# Patient Record
Sex: MALE | Race: WHITE | NOT HISPANIC OR LATINO | Employment: FULL TIME | URBAN - METROPOLITAN AREA
[De-identification: names, ages, dates, MRNs, and addresses within clinical notes are randomized per-mention and may not be internally consistent; named-entity substitution may affect disease eponyms.]

---

## 2023-11-23 ENCOUNTER — HOSPITAL ENCOUNTER (INPATIENT)
Facility: HOSPITAL | Age: 43
LOS: 4 days | Discharge: HOME | DRG: 641 | End: 2023-11-27
Attending: STUDENT IN AN ORGANIZED HEALTH CARE EDUCATION/TRAINING PROGRAM | Admitting: INTERNAL MEDICINE
Payer: MEDICAID

## 2023-11-23 DIAGNOSIS — R11.2 NAUSEA AND VOMITING, UNSPECIFIED VOMITING TYPE: ICD-10-CM

## 2023-11-23 DIAGNOSIS — D62 ACUTE BLOOD LOSS ANEMIA: ICD-10-CM

## 2023-11-23 DIAGNOSIS — R07.9 CHEST PAIN: ICD-10-CM

## 2023-11-23 DIAGNOSIS — E87.6 HYPOKALEMIA: ICD-10-CM

## 2023-11-23 DIAGNOSIS — F10.20 ALCOHOLISM: Primary | ICD-10-CM

## 2023-11-23 LAB
ALBUMIN SERPL BCP-MCNC: 2.3 G/DL (ref 3.5–5.2)
ALP SERPL-CCNC: 204 U/L (ref 55–135)
ALT SERPL W/O P-5'-P-CCNC: 52 U/L (ref 10–44)
ANION GAP SERPL CALC-SCNC: 18 MMOL/L (ref 8–16)
AST SERPL-CCNC: 158 U/L (ref 10–40)
BASOPHILS # BLD AUTO: 0.03 K/UL (ref 0–0.2)
BASOPHILS NFR BLD: 0.5 % (ref 0–1.9)
BILIRUB SERPL-MCNC: 2.8 MG/DL (ref 0.1–1)
BILIRUB UR QL STRIP: ABNORMAL
BUN SERPL-MCNC: 5 MG/DL (ref 6–20)
CALCIUM SERPL-MCNC: 7.4 MG/DL (ref 8.7–10.5)
CHLORIDE SERPL-SCNC: 98 MMOL/L (ref 95–110)
CLARITY UR: CLEAR
CO2 SERPL-SCNC: 18 MMOL/L (ref 23–29)
COLOR UR: YELLOW
CREAT SERPL-MCNC: 0.5 MG/DL (ref 0.5–1.4)
DIFFERENTIAL METHOD: ABNORMAL
EOSINOPHIL # BLD AUTO: 0 K/UL (ref 0–0.5)
EOSINOPHIL NFR BLD: 0 % (ref 0–8)
ERYTHROCYTE [DISTWIDTH] IN BLOOD BY AUTOMATED COUNT: 14.8 % (ref 11.5–14.5)
EST. GFR  (NO RACE VARIABLE): >60 ML/MIN/1.73 M^2
ETHANOL SERPL-MCNC: <10 MG/DL
GLUCOSE SERPL-MCNC: 166 MG/DL (ref 70–110)
GLUCOSE UR QL STRIP: NEGATIVE
HCT VFR BLD AUTO: 26.5 % (ref 40–54)
HGB BLD-MCNC: 9.2 G/DL (ref 14–18)
HGB UR QL STRIP: NEGATIVE
IMM GRANULOCYTES # BLD AUTO: 0.01 K/UL (ref 0–0.04)
IMM GRANULOCYTES NFR BLD AUTO: 0.2 % (ref 0–0.5)
KETONES UR QL STRIP: NEGATIVE
LEUKOCYTE ESTERASE UR QL STRIP: NEGATIVE
LIPASE SERPL-CCNC: 24 U/L (ref 4–60)
LYMPHOCYTES # BLD AUTO: 0.9 K/UL (ref 1–4.8)
LYMPHOCYTES NFR BLD: 15.8 % (ref 18–48)
MAGNESIUM SERPL-MCNC: 1.2 MG/DL (ref 1.6–2.6)
MCH RBC QN AUTO: 39 PG (ref 27–31)
MCHC RBC AUTO-ENTMCNC: 34.7 G/DL (ref 32–36)
MCV RBC AUTO: 112 FL (ref 82–98)
MONOCYTES # BLD AUTO: 0.6 K/UL (ref 0.3–1)
MONOCYTES NFR BLD: 10.3 % (ref 4–15)
NEUTROPHILS # BLD AUTO: 4.1 K/UL (ref 1.8–7.7)
NEUTROPHILS NFR BLD: 73.2 % (ref 38–73)
NITRITE UR QL STRIP: NEGATIVE
NRBC BLD-RTO: 0 /100 WBC
PH UR STRIP: 7 [PH] (ref 5–8)
PLATELET # BLD AUTO: 93 K/UL (ref 150–450)
PMV BLD AUTO: 12.2 FL (ref 9.2–12.9)
POTASSIUM SERPL-SCNC: 2.8 MMOL/L (ref 3.5–5.1)
PROT SERPL-MCNC: 5.8 G/DL (ref 6–8.4)
PROT UR QL STRIP: ABNORMAL
RBC # BLD AUTO: 2.36 M/UL (ref 4.6–6.2)
SODIUM SERPL-SCNC: 134 MMOL/L (ref 136–145)
SP GR UR STRIP: 1.01 (ref 1–1.03)
URN SPEC COLLECT METH UR: ABNORMAL
UROBILINOGEN UR STRIP-ACNC: NEGATIVE EU/DL
WBC # BLD AUTO: 5.64 K/UL (ref 3.9–12.7)

## 2023-11-23 PROCEDURE — 21400001 HC TELEMETRY ROOM

## 2023-11-23 PROCEDURE — 96361 HYDRATE IV INFUSION ADD-ON: CPT

## 2023-11-23 PROCEDURE — 96365 THER/PROPH/DIAG IV INF INIT: CPT

## 2023-11-23 PROCEDURE — 82746 ASSAY OF FOLIC ACID SERUM: CPT | Performed by: INTERNAL MEDICINE

## 2023-11-23 PROCEDURE — 25000003 PHARM REV CODE 250: Performed by: STUDENT IN AN ORGANIZED HEALTH CARE EDUCATION/TRAINING PROGRAM

## 2023-11-23 PROCEDURE — 82607 VITAMIN B-12: CPT | Performed by: INTERNAL MEDICINE

## 2023-11-23 PROCEDURE — 85025 COMPLETE CBC W/AUTO DIFF WBC: CPT | Performed by: STUDENT IN AN ORGANIZED HEALTH CARE EDUCATION/TRAINING PROGRAM

## 2023-11-23 PROCEDURE — 93005 ELECTROCARDIOGRAM TRACING: CPT | Performed by: GENERAL PRACTICE

## 2023-11-23 PROCEDURE — 99285 EMERGENCY DEPT VISIT HI MDM: CPT | Mod: 25

## 2023-11-23 PROCEDURE — 84466 ASSAY OF TRANSFERRIN: CPT | Performed by: INTERNAL MEDICINE

## 2023-11-23 PROCEDURE — 83735 ASSAY OF MAGNESIUM: CPT | Performed by: INTERNAL MEDICINE

## 2023-11-23 PROCEDURE — 82077 ASSAY SPEC XCP UR&BREATH IA: CPT | Performed by: STUDENT IN AN ORGANIZED HEALTH CARE EDUCATION/TRAINING PROGRAM

## 2023-11-23 PROCEDURE — 83540 ASSAY OF IRON: CPT | Performed by: INTERNAL MEDICINE

## 2023-11-23 PROCEDURE — 93010 EKG 12-LEAD: ICD-10-PCS | Mod: ,,, | Performed by: GENERAL PRACTICE

## 2023-11-23 PROCEDURE — 96375 TX/PRO/DX INJ NEW DRUG ADDON: CPT

## 2023-11-23 PROCEDURE — 36415 COLL VENOUS BLD VENIPUNCTURE: CPT | Performed by: INTERNAL MEDICINE

## 2023-11-23 PROCEDURE — 80053 COMPREHEN METABOLIC PANEL: CPT | Performed by: STUDENT IN AN ORGANIZED HEALTH CARE EDUCATION/TRAINING PROGRAM

## 2023-11-23 PROCEDURE — 93010 ELECTROCARDIOGRAM REPORT: CPT | Mod: ,,, | Performed by: GENERAL PRACTICE

## 2023-11-23 PROCEDURE — 81003 URINALYSIS AUTO W/O SCOPE: CPT | Performed by: STUDENT IN AN ORGANIZED HEALTH CARE EDUCATION/TRAINING PROGRAM

## 2023-11-23 PROCEDURE — 83690 ASSAY OF LIPASE: CPT | Performed by: STUDENT IN AN ORGANIZED HEALTH CARE EDUCATION/TRAINING PROGRAM

## 2023-11-23 PROCEDURE — 82728 ASSAY OF FERRITIN: CPT | Performed by: INTERNAL MEDICINE

## 2023-11-23 PROCEDURE — 63600175 PHARM REV CODE 636 W HCPCS: Performed by: STUDENT IN AN ORGANIZED HEALTH CARE EDUCATION/TRAINING PROGRAM

## 2023-11-23 RX ORDER — ONDANSETRON 2 MG/ML
4 INJECTION INTRAMUSCULAR; INTRAVENOUS
Status: COMPLETED | OUTPATIENT
Start: 2023-11-23 | End: 2023-11-23

## 2023-11-23 RX ORDER — SODIUM,POTASSIUM PHOSPHATES 280-250MG
2 POWDER IN PACKET (EA) ORAL
Status: DISCONTINUED | OUTPATIENT
Start: 2023-11-24 | End: 2023-11-27 | Stop reason: HOSPADM

## 2023-11-23 RX ORDER — LORAZEPAM 2 MG/ML
2 INJECTION INTRAMUSCULAR EVERY 4 HOURS PRN
Status: DISCONTINUED | OUTPATIENT
Start: 2023-11-24 | End: 2023-11-27 | Stop reason: HOSPADM

## 2023-11-23 RX ORDER — ACETAMINOPHEN 500 MG
1000 TABLET ORAL
Status: DISCONTINUED | OUTPATIENT
Start: 2023-11-23 | End: 2023-11-23

## 2023-11-23 RX ORDER — IBUPROFEN 200 MG
16 TABLET ORAL
Status: DISCONTINUED | OUTPATIENT
Start: 2023-11-24 | End: 2023-11-27 | Stop reason: HOSPADM

## 2023-11-23 RX ORDER — PANTOPRAZOLE SODIUM 40 MG/10ML
40 INJECTION, POWDER, LYOPHILIZED, FOR SOLUTION INTRAVENOUS DAILY
Status: DISCONTINUED | OUTPATIENT
Start: 2023-11-24 | End: 2023-11-24

## 2023-11-23 RX ORDER — POTASSIUM CHLORIDE 7.45 MG/ML
60 INJECTION INTRAVENOUS ONCE
Status: COMPLETED | OUTPATIENT
Start: 2023-11-24 | End: 2023-11-24

## 2023-11-23 RX ORDER — LORAZEPAM 2 MG/ML
0.5 INJECTION INTRAMUSCULAR EVERY 4 HOURS PRN
Status: DISCONTINUED | OUTPATIENT
Start: 2023-11-24 | End: 2023-11-27 | Stop reason: HOSPADM

## 2023-11-23 RX ORDER — POTASSIUM CHLORIDE 7.45 MG/ML
10 INJECTION INTRAVENOUS ONCE
Status: COMPLETED | OUTPATIENT
Start: 2023-11-23 | End: 2023-11-24

## 2023-11-23 RX ORDER — GLUCAGON 1 MG
1 KIT INJECTION
Status: DISCONTINUED | OUTPATIENT
Start: 2023-11-24 | End: 2023-11-27 | Stop reason: HOSPADM

## 2023-11-23 RX ORDER — LORAZEPAM 2 MG/ML
1 INJECTION INTRAMUSCULAR EVERY 4 HOURS PRN
Status: DISCONTINUED | OUTPATIENT
Start: 2023-11-24 | End: 2023-11-27 | Stop reason: HOSPADM

## 2023-11-23 RX ORDER — POTASSIUM CHLORIDE 7.45 MG/ML
60 INJECTION INTRAVENOUS ONCE
Status: DISCONTINUED | OUTPATIENT
Start: 2023-11-23 | End: 2023-11-23

## 2023-11-23 RX ORDER — CHLORDIAZEPOXIDE HYDROCHLORIDE 5 MG/1
5 CAPSULE, GELATIN COATED ORAL 3 TIMES DAILY
Status: DISCONTINUED | OUTPATIENT
Start: 2023-11-23 | End: 2023-11-25

## 2023-11-23 RX ORDER — SPIRONOLACTONE 25 MG/1
25 TABLET ORAL ONCE
Status: COMPLETED | OUTPATIENT
Start: 2023-11-23 | End: 2023-11-24

## 2023-11-23 RX ORDER — LANOLIN ALCOHOL/MO/W.PET/CERES
400 CREAM (GRAM) TOPICAL ONCE
Status: COMPLETED | OUTPATIENT
Start: 2023-11-23 | End: 2023-11-23

## 2023-11-23 RX ORDER — MAGNESIUM SULFATE HEPTAHYDRATE 40 MG/ML
2 INJECTION, SOLUTION INTRAVENOUS
Status: DISCONTINUED | OUTPATIENT
Start: 2023-11-24 | End: 2023-11-27 | Stop reason: HOSPADM

## 2023-11-23 RX ORDER — THIAMINE HCL 100 MG
100 TABLET ORAL DAILY
Status: DISCONTINUED | OUTPATIENT
Start: 2023-11-24 | End: 2023-11-27 | Stop reason: HOSPADM

## 2023-11-23 RX ORDER — MAGNESIUM SULFATE HEPTAHYDRATE 40 MG/ML
4 INJECTION, SOLUTION INTRAVENOUS
Status: DISCONTINUED | OUTPATIENT
Start: 2023-11-24 | End: 2023-11-27 | Stop reason: HOSPADM

## 2023-11-23 RX ORDER — CHLORDIAZEPOXIDE HYDROCHLORIDE 10 MG/1
10 CAPSULE, GELATIN COATED ORAL 3 TIMES DAILY
Status: DISCONTINUED | OUTPATIENT
Start: 2023-11-23 | End: 2023-11-23

## 2023-11-23 RX ORDER — SODIUM CHLORIDE 9 MG/ML
INJECTION, SOLUTION INTRAVENOUS CONTINUOUS
Status: ACTIVE | OUTPATIENT
Start: 2023-11-24 | End: 2023-11-25

## 2023-11-23 RX ORDER — IBUPROFEN 200 MG
24 TABLET ORAL
Status: DISCONTINUED | OUTPATIENT
Start: 2023-11-24 | End: 2023-11-27 | Stop reason: HOSPADM

## 2023-11-23 RX ORDER — MAGNESIUM SULFATE HEPTAHYDRATE 40 MG/ML
4 INJECTION, SOLUTION INTRAVENOUS ONCE
Status: COMPLETED | OUTPATIENT
Start: 2023-11-23 | End: 2023-11-24

## 2023-11-23 RX ORDER — NALOXONE HCL 0.4 MG/ML
0.02 VIAL (ML) INJECTION
Status: DISCONTINUED | OUTPATIENT
Start: 2023-11-24 | End: 2023-11-27 | Stop reason: HOSPADM

## 2023-11-23 RX ORDER — CHLORDIAZEPOXIDE HYDROCHLORIDE 25 MG/1
25 CAPSULE, GELATIN COATED ORAL 3 TIMES DAILY
Status: DISCONTINUED | OUTPATIENT
Start: 2023-11-23 | End: 2023-11-23

## 2023-11-23 RX ORDER — SODIUM CHLORIDE 0.9 % (FLUSH) 0.9 %
10 SYRINGE (ML) INJECTION EVERY 12 HOURS PRN
Status: DISCONTINUED | OUTPATIENT
Start: 2023-11-24 | End: 2023-11-27 | Stop reason: HOSPADM

## 2023-11-23 RX ADMIN — SODIUM CHLORIDE 1000 ML: 0.9 INJECTION, SOLUTION INTRAVENOUS at 08:11

## 2023-11-23 RX ADMIN — POTASSIUM BICARBONATE 40 MEQ: 782 TABLET, EFFERVESCENT ORAL at 09:11

## 2023-11-23 RX ADMIN — THIAMINE HYDROCHLORIDE 100 MG: 100 INJECTION, SOLUTION INTRAMUSCULAR; INTRAVENOUS at 10:11

## 2023-11-23 RX ADMIN — Medication 400 MG: at 09:11

## 2023-11-23 RX ADMIN — SODIUM CHLORIDE, SODIUM LACTATE, POTASSIUM CHLORIDE, AND CALCIUM CHLORIDE 1000 ML: .6; .31; .03; .02 INJECTION, SOLUTION INTRAVENOUS at 11:11

## 2023-11-23 RX ADMIN — POTASSIUM CHLORIDE 10 MEQ: 7.46 INJECTION, SOLUTION INTRAVENOUS at 11:11

## 2023-11-23 RX ADMIN — ONDANSETRON 4 MG: 2 INJECTION INTRAMUSCULAR; INTRAVENOUS at 08:11

## 2023-11-24 LAB
ALBUMIN SERPL BCP-MCNC: 2 G/DL (ref 3.5–5.2)
ALP SERPL-CCNC: 189 U/L (ref 55–135)
ALT SERPL W/O P-5'-P-CCNC: 45 U/L (ref 10–44)
AMMONIA PLAS-SCNC: 80 UMOL/L (ref 10–50)
ANION GAP SERPL CALC-SCNC: 9 MMOL/L (ref 8–16)
AST SERPL-CCNC: 147 U/L (ref 10–40)
BASOPHILS # BLD AUTO: 0.03 K/UL (ref 0–0.2)
BASOPHILS NFR BLD: 0.5 % (ref 0–1.9)
BILIRUB SERPL-MCNC: 2.9 MG/DL (ref 0.1–1)
BUN SERPL-MCNC: 3 MG/DL (ref 6–20)
CALCIUM SERPL-MCNC: 6.9 MG/DL (ref 8.7–10.5)
CHLORIDE SERPL-SCNC: 105 MMOL/L (ref 95–110)
CO2 SERPL-SCNC: 21 MMOL/L (ref 23–29)
CREAT SERPL-MCNC: 0.4 MG/DL (ref 0.5–1.4)
DIFFERENTIAL METHOD: ABNORMAL
EOSINOPHIL # BLD AUTO: 0.1 K/UL (ref 0–0.5)
EOSINOPHIL NFR BLD: 0.9 % (ref 0–8)
ERYTHROCYTE [DISTWIDTH] IN BLOOD BY AUTOMATED COUNT: 14.8 % (ref 11.5–14.5)
EST. GFR  (NO RACE VARIABLE): >60 ML/MIN/1.73 M^2
FERRITIN SERPL-MCNC: 1206 NG/ML (ref 20–300)
FOLATE SERPL-MCNC: 5.4 NG/ML (ref 4–24)
GLUCOSE SERPL-MCNC: 90 MG/DL (ref 70–110)
HCT VFR BLD AUTO: 23.6 % (ref 40–54)
HGB BLD-MCNC: 8.2 G/DL (ref 14–18)
IMM GRANULOCYTES # BLD AUTO: 0.03 K/UL (ref 0–0.04)
IMM GRANULOCYTES NFR BLD AUTO: 0.5 % (ref 0–0.5)
INR PPP: 1.3 (ref 0.8–1.2)
IRON SERPL-MCNC: 102 UG/DL (ref 45–160)
LACTATE SERPL-SCNC: 1.8 MMOL/L (ref 0.5–1.9)
LACTATE SERPL-SCNC: 5.3 MMOL/L (ref 0.5–1.9)
LYMPHOCYTES # BLD AUTO: 2 K/UL (ref 1–4.8)
LYMPHOCYTES NFR BLD: 35.9 % (ref 18–48)
MCH RBC QN AUTO: 38.5 PG (ref 27–31)
MCHC RBC AUTO-ENTMCNC: 34.7 G/DL (ref 32–36)
MCV RBC AUTO: 111 FL (ref 82–98)
MONOCYTES # BLD AUTO: 0.5 K/UL (ref 0.3–1)
MONOCYTES NFR BLD: 9.3 % (ref 4–15)
NEUTROPHILS # BLD AUTO: 2.9 K/UL (ref 1.8–7.7)
NEUTROPHILS NFR BLD: 52.9 % (ref 38–73)
NRBC BLD-RTO: 0 /100 WBC
PLATELET # BLD AUTO: 77 K/UL (ref 150–450)
PMV BLD AUTO: 12.1 FL (ref 9.2–12.9)
POTASSIUM SERPL-SCNC: 3.3 MMOL/L (ref 3.5–5.1)
PROT SERPL-MCNC: 5 G/DL (ref 6–8.4)
PROTHROMBIN TIME: 14.6 SEC (ref 9–12.5)
RBC # BLD AUTO: 2.13 M/UL (ref 4.6–6.2)
SATURATED IRON: ABNORMAL % (ref 20–50)
SODIUM SERPL-SCNC: 135 MMOL/L (ref 136–145)
TOTAL IRON BINDING CAPACITY: ABNORMAL UG/DL (ref 250–450)
TRANSFERRIN SERPL-MCNC: <75 MG/DL (ref 200–375)
VIT B12 SERPL-MCNC: 873 PG/ML (ref 210–950)
WBC # BLD AUTO: 5.46 K/UL (ref 3.9–12.7)

## 2023-11-24 PROCEDURE — C9113 INJ PANTOPRAZOLE SODIUM, VIA: HCPCS | Performed by: STUDENT IN AN ORGANIZED HEALTH CARE EDUCATION/TRAINING PROGRAM

## 2023-11-24 PROCEDURE — 25000003 PHARM REV CODE 250: Performed by: INTERNAL MEDICINE

## 2023-11-24 PROCEDURE — 82140 ASSAY OF AMMONIA: CPT | Performed by: INTERNAL MEDICINE

## 2023-11-24 PROCEDURE — 63600175 PHARM REV CODE 636 W HCPCS: Performed by: INTERNAL MEDICINE

## 2023-11-24 PROCEDURE — 63600175 PHARM REV CODE 636 W HCPCS: Performed by: STUDENT IN AN ORGANIZED HEALTH CARE EDUCATION/TRAINING PROGRAM

## 2023-11-24 PROCEDURE — 25000003 PHARM REV CODE 250: Performed by: STUDENT IN AN ORGANIZED HEALTH CARE EDUCATION/TRAINING PROGRAM

## 2023-11-24 PROCEDURE — S4991 NICOTINE PATCH NONLEGEND: HCPCS | Performed by: STUDENT IN AN ORGANIZED HEALTH CARE EDUCATION/TRAINING PROGRAM

## 2023-11-24 PROCEDURE — 85025 COMPLETE CBC W/AUTO DIFF WBC: CPT | Performed by: INTERNAL MEDICINE

## 2023-11-24 PROCEDURE — 83605 ASSAY OF LACTIC ACID: CPT | Performed by: STUDENT IN AN ORGANIZED HEALTH CARE EDUCATION/TRAINING PROGRAM

## 2023-11-24 PROCEDURE — 36415 COLL VENOUS BLD VENIPUNCTURE: CPT | Performed by: INTERNAL MEDICINE

## 2023-11-24 PROCEDURE — 83605 ASSAY OF LACTIC ACID: CPT | Mod: 91 | Performed by: INTERNAL MEDICINE

## 2023-11-24 PROCEDURE — 92610 EVALUATE SWALLOWING FUNCTION: CPT

## 2023-11-24 PROCEDURE — 21400001 HC TELEMETRY ROOM

## 2023-11-24 PROCEDURE — 80053 COMPREHEN METABOLIC PANEL: CPT | Performed by: INTERNAL MEDICINE

## 2023-11-24 PROCEDURE — 85610 PROTHROMBIN TIME: CPT | Performed by: INTERNAL MEDICINE

## 2023-11-24 RX ORDER — PANTOPRAZOLE SODIUM 40 MG/10ML
40 INJECTION, POWDER, LYOPHILIZED, FOR SOLUTION INTRAVENOUS 2 TIMES DAILY
Status: DISCONTINUED | OUTPATIENT
Start: 2023-11-24 | End: 2023-11-25

## 2023-11-24 RX ORDER — OXYCODONE HYDROCHLORIDE 5 MG/1
5 TABLET ORAL EVERY 6 HOURS PRN
Status: DISCONTINUED | OUTPATIENT
Start: 2023-11-24 | End: 2023-11-27 | Stop reason: HOSPADM

## 2023-11-24 RX ORDER — CITALOPRAM 10 MG/1
10 TABLET ORAL DAILY
Status: DISCONTINUED | OUTPATIENT
Start: 2023-11-24 | End: 2023-11-26

## 2023-11-24 RX ORDER — IBUPROFEN 200 MG
1 TABLET ORAL DAILY
Status: DISCONTINUED | OUTPATIENT
Start: 2023-11-24 | End: 2023-11-27 | Stop reason: HOSPADM

## 2023-11-24 RX ADMIN — THIAMINE HCL TAB 100 MG 100 MG: 100 TAB at 08:11

## 2023-11-24 RX ADMIN — NICOTINE 1 PATCH: 14 PATCH, EXTENDED RELEASE TRANSDERMAL at 08:11

## 2023-11-24 RX ADMIN — MAGNESIUM SULFATE HEPTAHYDRATE 4 G: 40 INJECTION, SOLUTION INTRAVENOUS at 01:11

## 2023-11-24 RX ADMIN — CHLORDIAZEPOXIDE HYDROCHLORIDE 5 MG: 5 CAPSULE ORAL at 08:11

## 2023-11-24 RX ADMIN — CITALOPRAM HYDROBROMIDE 10 MG: 10 TABLET ORAL at 03:11

## 2023-11-24 RX ADMIN — OXYCODONE HYDROCHLORIDE 5 MG: 5 TABLET ORAL at 03:11

## 2023-11-24 RX ADMIN — SPIRONOLACTONE 25 MG: 25 TABLET ORAL at 01:11

## 2023-11-24 RX ADMIN — PANTOPRAZOLE SODIUM 40 MG: 40 INJECTION, POWDER, LYOPHILIZED, FOR SOLUTION INTRAVENOUS at 02:11

## 2023-11-24 RX ADMIN — CHLORDIAZEPOXIDE HYDROCHLORIDE 5 MG: 5 CAPSULE ORAL at 03:11

## 2023-11-24 RX ADMIN — POTASSIUM CHLORIDE 60 MEQ: 7.45 INJECTION INTRAVENOUS at 01:11

## 2023-11-24 RX ADMIN — ASCORBIC ACID, VITAMIN A PALMITATE, CHOLECALCIFEROL, THIAMINE HYDROCHLORIDE, RIBOFLAVIN-5 PHOSPHATE SODIUM, PYRIDOXINE HYDROCHLORIDE, NIACINAMIDE, DEXPANTHENOL, ALPHA-TOCOPHEROL ACETATE, VITAMIN K1, FOLIC ACID, BIOTIN, CYANOCOBALAMIN: 200; 3300; 200; 6; 3.6; 6; 40; 15; 10; 150; 600; 60; 5 INJECTION, SOLUTION INTRAVENOUS at 02:11

## 2023-11-24 RX ADMIN — SODIUM CHLORIDE: 0.9 INJECTION, SOLUTION INTRAVENOUS at 02:11

## 2023-11-24 RX ADMIN — PANTOPRAZOLE SODIUM 40 MG: 40 INJECTION, POWDER, LYOPHILIZED, FOR SOLUTION INTRAVENOUS at 08:11

## 2023-11-24 RX ADMIN — CHLORDIAZEPOXIDE HYDROCHLORIDE 5 MG: 5 CAPSULE ORAL at 01:11

## 2023-11-24 NOTE — ED PROVIDER NOTES
"Encounter Date: 11/23/2023       History     Chief Complaint   Patient presents with    Emesis     Vomiting starting today, pt reports he usually drinks daily, last drink yesterday     43-year-old male with history of chronic alcoholism presents now for decreased p.o. intake, inability to tolerate solids ongoing for the past few days.  He drinks vodka daily, up to a handled daily, and is able to tolerate liquids.  He drank alcohol yesterday without issue.  This morning, he was unable to eat solid food.  He has been intolerant of solid food for the past day or 2.  This normally happens after heavy drinking.  Patient does not want to quit drinking.  His last drink was 1 day ago.  He denies any pain at this point.  He denies any nausea or vomiting now.  He does not want to quit drinking.  He minimizes his symptoms stating "it was okay when I was only drinking beer".  His sister's with him in his concerned for his alcoholism.        Review of patient's allergies indicates:   Allergen Reactions    Wellbutrin [bupropion hcl] Nausea And Vomiting     No past medical history on file.  No past surgical history on file.  No family history on file.     Review of Systems   Constitutional:  Negative for activity change and appetite change.   HENT:  Negative for congestion and drooling.    Eyes:  Negative for discharge and itching.   Respiratory:  Negative for cough and chest tightness.    Cardiovascular:  Negative for chest pain and leg swelling.   Gastrointestinal:  Positive for nausea and vomiting. Negative for abdominal distention and abdominal pain.   Genitourinary:  Negative for difficulty urinating and dysuria.   Musculoskeletal:  Negative for arthralgias.   Skin:  Negative for color change and pallor.   Neurological:  Negative for dizziness and facial asymmetry.   Psychiatric/Behavioral:  Negative for agitation and behavioral problems.        Physical Exam     Initial Vitals [11/23/23 1953]   BP Pulse Resp Temp SpO2   (!) " 141/95 104 16 98.3 °F (36.8 °C) 100 %      MAP       --         Physical Exam    Nursing note and vitals reviewed.  Constitutional: He appears well-developed.   Pleasant, chronically ill-appearing male   HENT:   Head: Normocephalic and atraumatic.   Mouth/Throat: Oropharynx is clear and moist.   Eyes: Conjunctivae and EOM are normal. Pupils are equal, round, and reactive to light.   Neck: No thyromegaly present.   Normal range of motion.  Cardiovascular:  Normal rate, regular rhythm and intact distal pulses.           Pulmonary/Chest: Breath sounds normal. No respiratory distress. He has no wheezes.   Abdominal: Abdomen is soft. Bowel sounds are normal. He exhibits no distension. There is no abdominal tenderness.   Musculoskeletal:         General: No tenderness or edema. Normal range of motion.      Cervical back: Normal range of motion.     Neurological: He is alert and oriented to person, place, and time. He has normal strength. No cranial nerve deficit.   Skin: Skin is warm and dry. No rash noted.   Psychiatric: He has a normal mood and affect. His behavior is normal. Thought content normal.         ED Course   Procedures  Labs Reviewed   CBC W/ AUTO DIFFERENTIAL - Abnormal; Notable for the following components:       Result Value    RBC 2.36 (*)     Hemoglobin 9.2 (*)     Hematocrit 26.5 (*)      (*)     MCH 39.0 (*)     RDW 14.8 (*)     Platelets 93 (*)     Lymph # 0.9 (*)     Gran % 73.2 (*)     Lymph % 15.8 (*)     All other components within normal limits   COMPREHENSIVE METABOLIC PANEL - Abnormal; Notable for the following components:    Sodium 134 (*)     Potassium 2.8 (*)     CO2 18 (*)     Glucose 166 (*)     BUN 5 (*)     Calcium 7.4 (*)     Total Protein 5.8 (*)     Albumin 2.3 (*)     Total Bilirubin 2.8 (*)     Alkaline Phosphatase 204 (*)      (*)     ALT 52 (*)     Anion Gap 18 (*)     All other components within normal limits   URINALYSIS, REFLEX TO URINE CULTURE - Abnormal; Notable  for the following components:    Protein, UA Trace (*)     Bilirubin (UA) 1+ (*)     All other components within normal limits    Narrative:     Specimen Source->Urine   MAGNESIUM - Abnormal; Notable for the following components:    Magnesium 1.2 (*)     All other components within normal limits   LACTIC ACID, PLASMA - Abnormal; Notable for the following components:    Lactate (Lactic Acid) 5.3 (*)     All other components within normal limits   IRON AND TIBC - Abnormal; Notable for the following components:    Transferrin <75 (*)     All other components within normal limits   FERRITIN - Abnormal; Notable for the following components:    Ferritin 1,206.0 (*)     All other components within normal limits   LIPASE   ALCOHOL,MEDICAL (ETHANOL)   VITAMIN B12   FOLATE   MAGNESIUM   VITAMIN B12   FOLATE   FERRITIN   IRON AND TIBC   PROTIME-INR   AMMONIA   CBC W/ AUTO DIFFERENTIAL   COMPREHENSIVE METABOLIC PANEL   LACTIC ACID, PLASMA     EKG Readings: (Independently Interpreted)   EKG with regular rate, regular rhythm, normal axis, no acute ST elevations or depressions, normal AL, QRS and QT interval. Interpreted by me.         Imaging Results              US Abdomen Limited (Final result)  Result time 11/23/23 23:56:48      Final result by Dolly Sol MD (11/23/23 23:56:48)                   Narrative:    EXAM:  US Abdomen Limited, Right Upper Quadrant    CLINICAL HISTORY:  The patient is 43 years old and is Male; elevated LFTs    TECHNIQUE:  Real-time ultrasound of the right upper quadrant with image documentation.    COMPARISON:  No relevant prior studies available.    FINDINGS:  Liver:  Diffuse increased echogenicity of the liver except at the gallbladder fossa. No focal liver mass.  No intrahepatic bile duct dilation.  Gallbladder:  Gallbladder containing sludge. No gallbladder wall thickening or pericholecystic fluid. Nontender to palpation.  Common bile duct:  No stones.  No dilation.  Pancreas: Obscured by  bowel gas.  Right kidney:  No right hydronephrosis. 11 cm in length.  No stones.    IMPRESSION:  1.  Fatty liver.  2.  Gallbladder sludge.    Electronically signed by:  Dolly Curtis MD  11/23/2023 11:56 PM [facility] Workstation: [id number]                                     Medications   sodium chloride 0.9% flush 10 mL (has no administration in time range)   naloxone 0.4 mg/mL injection 0.02 mg (has no administration in time range)   glucose chewable tablet 16 g (has no administration in time range)   glucose chewable tablet 24 g (has no administration in time range)   dextrose 50% injection 12.5 g (has no administration in time range)   dextrose 50% injection 25 g (has no administration in time range)   glucagon (human recombinant) injection 1 mg (has no administration in time range)   potassium bicarbonate disintegrating tablet 50 mEq (has no administration in time range)   potassium bicarbonate disintegrating tablet 35 mEq (has no administration in time range)   potassium bicarbonate disintegrating tablet 60 mEq (has no administration in time range)   promethazine (PHENERGAN) 25 mg in dextrose 5 % (D5W) 50 mL IVPB (has no administration in time range)   0.9%  NaCl infusion ( Intravenous New Bag 11/24/23 0251)   potassium bicarbonate disintegrating tablet 50 mEq (has no administration in time range)   potassium bicarbonate disintegrating tablet 35 mEq (has no administration in time range)   potassium bicarbonate disintegrating tablet 60 mEq (has no administration in time range)   potassium, sodium phosphates 280-160-250 mg packet 2 packet (has no administration in time range)   potassium, sodium phosphates 280-160-250 mg packet 2 packet (has no administration in time range)   potassium, sodium phosphates 280-160-250 mg packet 2 packet (has no administration in time range)   magnesium sulfate 2g in water 50mL IVPB (premix) (has no administration in time range)   magnesium sulfate 2g in water 50mL IVPB (premix)  (has no administration in time range)   chlordiazepoxide capsule 5 mg (5 mg Oral Given 11/24/23 0128)   LORazepam injection 0.5 mg (has no administration in time range)   LORazepam injection 1 mg (has no administration in time range)   LORazepam injection 2 mg (has no administration in time range)   thiamine tablet 100 mg (has no administration in time range)   pantoprazole injection 40 mg (40 mg Intravenous Given 11/24/23 0251)   sodium chloride 0.9% bolus 1,000 mL 1,000 mL (0 mLs Intravenous Stopped 11/23/23 2154)   ondansetron injection 4 mg (4 mg Intravenous Given 11/23/23 2055)   thiamine (B-1) 100 mg in dextrose 5 % (D5W) 100 mL IVPB (0 mg Intravenous Stopped 11/23/23 2313)   potassium chloride 10 mEq in 100 mL IVPB (0 mEq Intravenous Stopped 11/24/23 0052)   potassium bicarbonate disintegrating tablet 40 mEq (40 mEq Oral Given 11/23/23 2153)   magnesium oxide tablet 400 mg (400 mg Oral Given 11/23/23 2153)   lactated ringers bolus 1,000 mL (0 mLs Intravenous Stopped 11/24/23 0042)   spironolactone tablet 25 mg (25 mg Oral Given 11/24/23 0128)   potassium chloride 10 mEq in 100 mL IVPB (60 mEq Intravenous New Bag 11/24/23 0127)   magnesium sulfate 2g in water 50mL IVPB (premix) (4 g Intravenous New Bag 11/24/23 0125)     Medical Decision Making  Differential diagnosis includes alcohol withdrawal, cholecystitis, intractable nausea and vomiting, gastritis, pancreatitis.  Patient ill-appearing but pleasant.  He admits to chronic alcohol use.  Vitals within normal limits.  Patient is nontoxic.  Abdomen is soft and nontender without rebound or guarding, doubt acute intra-abdominal pathology.  Workup notable for normal lipase, doubt pancreatitis.  Ethanol within normal limits, doubt alcohol intoxication.  CMP notable for anion gap, hypokalemia and hypo magnesemia.  Likely patient has mixed alcoholic ketoacidosis and lactic acidosis from dehydration.  CMP notable for elevated ALT and AST most consistent with  alcoholic hepatitis.  Patient given 2 L IV fluid and p.o. challenge without success.  Will admit to hospital medicine for dehydration, electrolyte abnormalities from persistent nausea and vomiting.    Amount and/or Complexity of Data Reviewed  Labs: ordered.    Risk  OTC drugs.  Prescription drug management.  Decision regarding hospitalization.               ED Course as of 11/24/23 0517 Fri Nov 24, 2023   0514 Comp. Metabolic Panel(!!) [BS]   0514 CBC W/ AUTO DIFFERENTIAL(!) [BS]   0514 Lipase [BS]   0514 Ethanol [BS]   0514 Magnesium(!) [BS]   0514 Urinalysis, Reflex to Urine Culture Urine, Clean Catch(!) [BS]   0514 Iron and TIBC(!) [BS]   0514 Ferritin(!) [BS]   0514 Lactic acid, plasma(!!) [BS]   0514 US Abdomen Limited [BS]      ED Course User Index  [BS] Merrill Contreras MD                        Clinical Impression:  Final diagnoses:  [E87.6] Hypokalemia  [R11.2] Nausea and vomiting, unspecified vomiting type (Primary)  [F10.20] Alcoholism          ED Disposition Condition    Admit                 Merrill Contreras MD  11/24/23 0517

## 2023-11-24 NOTE — PROGRESS NOTES
"Critical access hospital Medicine  Progress Note    Patient Name: Herson Araiza  MRN: [id number]  Patient Class: IP- Inpatient   Admission Date: 11/23/2023  Length of Stay: 1 days  Attending Physician: Mukesh Lorenzo MD  Primary Care Provider: Norma, Primary Doctor        Subjective:     Principal Problem:Hypokalemia        HPI:  HPI: 44 y/o M drinks 5th of vodka daily, 1PPD smoking, presents to the ER for vomiting starting yesterday.  He has been  unable to tolerate solids.  His last drink was yesterday. Sister told him to come in.      In ER was with tachycardia, hypokalemia, elevated transaminase, hyponatremic     Denies past medical history  NKDA    Overview/Hospital Course:  11/24  Assumed care. Discussed with GI: was fed by SLP this AM--EGD not possible today; to be npo p midnight with EGD in AM. Labs reviewed and noted below: no leukocytosis with moderate macrocytic anemia; trivial hyponatremia with hypokalemia (sliding scale); mild hyperbilirubinemia with mild elevation of transaminases; lactate normal. Telemetry reviewed: sinus. Day 2 of no ETOH. Feels "OK". No tremor (yet). Wife at bedside: patient normally take citalopram 10 mg q day, which he is not currently on. Plan: NPO p midnight; continue current course; adding above citalopram; "Banana Bag" X 1 liter then back to NS; electrolyte sliding scale; TSH with AM labs for review    Interval History: ETOH abuse    Review of Systems   Constitutional:  Positive for fatigue.   HENT: Negative.     Eyes: Negative.    Respiratory: Negative.     Cardiovascular: Negative.    Gastrointestinal: Negative.    Endocrine: Negative.    Genitourinary: Negative.    Musculoskeletal: Negative.    Skin: Negative.    Allergic/Immunologic: Negative.    Neurological: Negative.    Hematological: Negative.    All other systems reviewed and are negative.    Objective:     Vital Signs (Most Recent):  Temp: 98.2 °F (36.8 °C) (11/24/23 1100)  Pulse: 107 (11/24/23 1100)  Resp: " 16 (11/24/23 1100)  BP: 99/71 (11/24/23 1100)  SpO2: 97 % (11/24/23 0701) Vital Signs (24h Range):  Temp:  [98.2 °F (36.8 °C)-98.4 °F (36.9 °C)] 98.2 °F (36.8 °C)  Pulse:  [] 107  Resp:  [15-20] 16  SpO2:  [97 %-100 %] 97 %  BP: ()/(64-98) 99/71     Weight: 60.3 kg (133 lb)  Body mass index is 21.47 kg/m².    Intake/Output Summary (Last 24 hours) at 11/24/2023 1321  Last data filed at 11/24/2023 0800  Gross per 24 hour   Intake 2298 ml   Output --   Net 2298 ml         Physical Exam  Vitals and nursing note reviewed.   Constitutional:       Appearance: He is well-developed.   HENT:      Head: Normocephalic and atraumatic.      Right Ear: External ear normal.      Left Ear: External ear normal.      Nose: Nose normal.   Eyes:      Conjunctiva/sclera: Conjunctivae normal.      Pupils: Pupils are equal, round, and reactive to light.   Cardiovascular:      Rate and Rhythm: Normal rate and regular rhythm.      Heart sounds: Normal heart sounds.   Pulmonary:      Effort: Pulmonary effort is normal.      Breath sounds: Normal breath sounds.   Abdominal:      General: Bowel sounds are normal.      Palpations: Abdomen is soft.   Musculoskeletal:         General: Normal range of motion.      Cervical back: Normal range of motion and neck supple.   Skin:     General: Skin is warm and dry.      Capillary Refill: Capillary refill takes less than 2 seconds.   Neurological:      Mental Status: He is alert and oriented to person, place, and time.   Psychiatric:         Behavior: Behavior normal.         Thought Content: Thought content normal.         Judgment: Judgment normal.             Significant Labs: All pertinent labs within the past 24 hours have been reviewed.  CBC:   Recent Labs   Lab 11/23/23 2026 11/24/23  0513   WBC 5.64 5.46   HGB 9.2* 8.2*   HCT 26.5* 23.6*   PLT 93* 77*     CMP:   Recent Labs   Lab 11/23/23 2026 11/24/23  0513   * 135*   K 2.8* 3.3*   CL 98 105   CO2 18* 21*   * 90   BUN  5* 3*   CREATININE 0.5 0.4*   CALCIUM 7.4* 6.9*   PROT 5.8* 5.0*   ALBUMIN 2.3* 2.0*   BILITOT 2.8* 2.9*   ALKPHOS 204* 189*   * 147*   ALT 52* 45*   ANIONGAP 18* 9     Lactic Acid:   Recent Labs   Lab 11/24/23  0131 11/24/23  0513   LACTATE 5.3* 1.8       Significant Imaging: I have reviewed all pertinent imaging results/findings within the past 24 hours.    Assessment/Plan:      No notes have been filed under this hospital service.  Service: Hospital Medicine    VTE Risk Mitigation (From admission, onward)           Ordered     IP VTE LOW RISK PATIENT  Once         11/23/23 2303     Place sequential compression device  Until discontinued         11/23/23 2303                    Discharge Planning   SHANIQUE: 11/28/2023     Code Status: Full Code   Is the patient medically ready for discharge?:     Reason for patient still in hospital (select all that apply): Patient trending condition, Laboratory test, and Treatment                     Mukesh Lorenzo MD  Department of Hospital Medicine   [facility]

## 2023-11-24 NOTE — PROGRESS NOTES
"Critical access hospital  Adult Nutrition   Consult Note (Initial Assessment)     SUMMARY     Recommendations  1) RD recommends to advance pts diet to a regular as soon as medically possible.   2) RD recommends to add Ensure HP to pts meal tray when diet is ordered.    Goals:   Pt to meet 100% of his energy and protein needs.    Nutrition Goal Status: goal not met    Communication of RD Recs: other (comment)    Dietitian Rounds Brief  MST of 3: Pt is a 43 yom admitted with hypokalemia and no pmh noted. He is NPO at this time due to nausea and difficulty chewing and swallowing. He was just evaluated by speech and they have recommended a regular diet. Pts LBM was yesterday.Will continue to follow prn.    Diet order:   Current Diet Order: NPO      Evaluation of Received Nutrient/Fluid Intake  Energy Calories Required: not meeting needs  Protein Required: not meeting needs  Fluid Required: meeting needs  Tolerance: not tolerating     % Intake of Estimated Energy Needs: 0%  % Meal Intake: NPO      Intake/Output Summary (Last 24 hours) at 11/24/2023 1044  Last data filed at 11/24/2023 0325  Gross per 24 hour   Intake 2298 ml   Output --   Net 2298 ml        Anthropometrics  Temp: 98.4 °F (36.9 °C)  Height Method: Stated  Height: 5' 6" (167.6 cm)  Height (inches): 66 in  Weight Method: Stated  Weight: 60.3 kg (133 lb)  Weight (lb): 133 lb  Ideal Body Weight (IBW), Male: 142 lb  % Ideal Body Weight, Male (lb): 93.66 %  BMI (Calculated): 21.5  BMI Grade: 18.5-24.9 - normal       Estimated/Assessed Needs  Weight Used For Calorie Calculations: 60.3 kg (132 lb 15 oz)  Energy Calorie Requirements (kcal): 7771-2010 kcals/day (30-35 kcals/kg ABW)  Energy Need Method: Kcal/kg  Protein Requirements:  g/day (1.5-2.0 g/kg IBW)  Weight Used For Protein Calculations: 65 kg (143 lb 4.8 oz)     Estimated Fluid Requirement Method: RDA Method  RDA Method (mL): 1809       Reason for Assessment  Reason For Assessment: identified at " risk by screening criteria  Diagnosis: other (see comments) (Hypokalemia)  Relevant Medical History: Denies past medical history  NKDA    Nutrition/Diet History  Food Allergies: NKFA  Factors Affecting Nutritional Intake: NPO    Nutrition Risk Screen  Nutrition Risk Screen: difficulty chewing/swallowing     MST Score: 3  Have you recently lost weight without trying?: Yes: 14-23 lbs  Weight loss score: 2  Have you been eating poorly because of a decreased appetite?: Yes  Appetite score: 1       Weight History:  Wt Readings from Last 5 Encounters:   11/23/23 60.3 kg (133 lb)        Lab/Procedures/Meds: Pertinent Labs/Meds Reviewed    Medications:Pertinent Medications Reviewed  Scheduled Meds:   chlordiazepoxide  5 mg Oral TID    pantoprazole  40 mg Intravenous BID    thiamine  100 mg Oral Daily     Continuous Infusions:   sodium chloride 0.9% 125 mL/hr at 11/24/23 0251     PRN Meds:.dextrose 50%, dextrose 50%, glucagon (human recombinant), glucose, glucose, lorazepam, lorazepam, lorazepam, magnesium sulfate IVPB, magnesium sulfate IVPB, naloxone, potassium bicarbonate, potassium bicarbonate, potassium bicarbonate, potassium bicarbonate, potassium bicarbonate, potassium bicarbonate, potassium, sodium phosphates, potassium, sodium phosphates, potassium, sodium phosphates, promethazine (PHENERGAN) 25 mg in dextrose 5 % (D5W) 50 mL IVPB, sodium chloride 0.9%    Labs: Pertinent Labs Reviewed  Clinical Chemistry:  Recent Labs   Lab 11/23/23 2026 11/24/23 0513   * 135*   K 2.8* 3.3*   CL 98 105   CO2 18* 21*   * 90   BUN 5* 3*   CREATININE 0.5 0.4*   CALCIUM 7.4* 6.9*   PROT 5.8* 5.0*   ALBUMIN 2.3* 2.0*   BILITOT 2.8* 2.9*   ALKPHOS 204* 189*   * 147*   ALT 52* 45*   ANIONGAP 18* 9   MG 1.2*  --    LIPASE 24  --      CBC:   Recent Labs   Lab 11/24/23  0513   WBC 5.46   RBC 2.13*   HGB 8.2*   HCT 23.6*   PLT 77*   *   MCH 38.5*   MCHC 34.7     Monitor and Evaluation  Food and Nutrient Intake:  food and beverage intake, energy intake  Food and Nutrient Adminstration: diet order  Knowledge/Beliefs/Attitudes: food and nutrition knowledge/skill, beliefs and attitudes  Physical Activity and Function: nutrition-related ADLs and IADLs, factors affecting access to physical activity  Anthropometric Measurements: weight, weight change, body mass index  Biochemical Data, Medical Tests and Procedures: lipid profile, inflammatory profile, glucose/endocrine profile, gastrointestinal profile, electrolyte and renal panel  Nutrition-Focused Physical Findings: overall appearance     Nutrition Risk  Level of Risk/Frequency of Follow-up: high     Nutrition Follow-Up  RD Follow-up?: Yes      Karuna Dickey RD 11/24/2023 10:44 AM

## 2023-11-24 NOTE — HOSPITAL COURSE
"11/24  Assumed care. Discussed with GI: was fed by SLP this AM--EGD not possible today; to be npo p midnight with EGD in AM. Labs reviewed and noted below: no leukocytosis with moderate macrocytic anemia; trivial hyponatremia with hypokalemia (sliding scale); mild hyperbilirubinemia with mild elevation of transaminases; lactate normal. Telemetry reviewed: sinus. Day 2 of no ETOH. Feels "OK". No tremor (yet). Wife at bedside: patient normally take citalopram 10 mg q day, which he is not currently on. Plan: NPO p midnight; continue current course; adding above citalopram; "Banana Bag" X 1 liter then back to NS; electrolyte sliding scale; TSH with AM labs for review.    11/25  Had some "Confusion" overnight according to wife at bedside and RN. Patient is A/O X 4 currently. Not aggressive/combative. No further N/V. Hands have started to develop very minimal tremor when compared to yesterday. Labs reviewed and noted below: no leukocytosis with fairly stable moderte macrocytic anemia; trivial hyponatremia, mild hypokalemia (on sliding scale) with normal renal function; hyperbilirubinemia continues to slowly rise, but elevated transaminases are falling; TSH is normal. BP has been soft overnight (high 90's-low 100's systolic). No CP/SOB. For EGD this AM.  Plan:  ml bolus, then continue NS infusion; continue prn lorazepam; increase scheduled chlordiazepoxide; if BP improves will start clonidine for further withdrawal therapy; keep on telemetry currently, but will transfer out should Precedex become necessary--keep close eye on patient; AM labs for review    11/26  Consultant's attendance and procedure noted and appreciated. Patient has not developed signs/symptoms of acute ETOH withdrawal. Mother at bedside and patient request outpatient referral to Psychiatry. Patient denies suicidal/homicidal ideation. No thought disorder. No tangential thinking. Is A/O X 4. Does not live here, but rather in [facility]. Will " "put consultation in for patient so he may have follow-up at discharge. No further N/V. Transaminases are improving. Hypokalemia was covered this AM. Feels "OK, but tired." Plan: decrease chlordiazepoxide to 5 mg q6h; repeat BMP this evening; continue regimen otherwise; hopefully discharge in AM     11/27  Doing well. Mother at bedside. Would like to be discharged home with outpatient follow-up in [facility] where he resides. Potassium has normalized. A/O X 4 without stigmata of withdrawal. Is ready for discharge with close outpatient follow-up. No suicidal/homicidal ideation. No thought disorder. No paranoid thinking.  VSS  Lungs no adventitious  Heart S1S2  Abdo soft  Imp Alcoholism  Plan discharge on tapering dose of chlordiazepoxide and citalopram as per discharge med rec ;ist below; act as tolerated; outpatient referral to Psychiatry placed; dicussed need to abstain; not ready yet to stop smoking; regular diet      "

## 2023-11-24 NOTE — SUBJECTIVE & OBJECTIVE
Interval History: ETOH abuse    Review of Systems   Constitutional:  Positive for fatigue.   HENT: Negative.     Eyes: Negative.    Respiratory: Negative.     Cardiovascular: Negative.    Gastrointestinal: Negative.    Endocrine: Negative.    Genitourinary: Negative.    Musculoskeletal: Negative.    Skin: Negative.    Allergic/Immunologic: Negative.    Neurological: Negative.    Hematological: Negative.    All other systems reviewed and are negative.    Objective:     Vital Signs (Most Recent):  Temp: 98.2 °F (36.8 °C) (11/24/23 1100)  Pulse: 107 (11/24/23 1100)  Resp: 16 (11/24/23 1100)  BP: 99/71 (11/24/23 1100)  SpO2: 97 % (11/24/23 0701) Vital Signs (24h Range):  Temp:  [98.2 °F (36.8 °C)-98.4 °F (36.9 °C)] 98.2 °F (36.8 °C)  Pulse:  [] 107  Resp:  [15-20] 16  SpO2:  [97 %-100 %] 97 %  BP: ()/(64-98) 99/71     Weight: 60.3 kg (133 lb)  Body mass index is 21.47 kg/m².    Intake/Output Summary (Last 24 hours) at 11/24/2023 1321  Last data filed at 11/24/2023 0800  Gross per 24 hour   Intake 2298 ml   Output --   Net 2298 ml         Physical Exam  Vitals and nursing note reviewed.   Constitutional:       Appearance: He is well-developed.   HENT:      Head: Normocephalic and atraumatic.      Right Ear: External ear normal.      Left Ear: External ear normal.      Nose: Nose normal.   Eyes:      Conjunctiva/sclera: Conjunctivae normal.      Pupils: Pupils are equal, round, and reactive to light.   Cardiovascular:      Rate and Rhythm: Normal rate and regular rhythm.      Heart sounds: Normal heart sounds.   Pulmonary:      Effort: Pulmonary effort is normal.      Breath sounds: Normal breath sounds.   Abdominal:      General: Bowel sounds are normal.      Palpations: Abdomen is soft.   Musculoskeletal:         General: Normal range of motion.      Cervical back: Normal range of motion and neck supple.   Skin:     General: Skin is warm and dry.      Capillary Refill: Capillary refill takes less than 2  seconds.   Neurological:      Mental Status: He is alert and oriented to person, place, and time.   Psychiatric:         Behavior: Behavior normal.         Thought Content: Thought content normal.         Judgment: Judgment normal.             Significant Labs: All pertinent labs within the past 24 hours have been reviewed.  CBC:   Recent Labs   Lab 11/23/23 2026 11/24/23 0513   WBC 5.64 5.46   HGB 9.2* 8.2*   HCT 26.5* 23.6*   PLT 93* 77*     CMP:   Recent Labs   Lab 11/23/23 2026 11/24/23 0513   * 135*   K 2.8* 3.3*   CL 98 105   CO2 18* 21*   * 90   BUN 5* 3*   CREATININE 0.5 0.4*   CALCIUM 7.4* 6.9*   PROT 5.8* 5.0*   ALBUMIN 2.3* 2.0*   BILITOT 2.8* 2.9*   ALKPHOS 204* 189*   * 147*   ALT 52* 45*   ANIONGAP 18* 9     Lactic Acid:   Recent Labs   Lab 11/24/23 0131 11/24/23 0513   LACTATE 5.3* 1.8       Significant Imaging: I have reviewed all pertinent imaging results/findings within the past 24 hours.

## 2023-11-24 NOTE — NURSING
Nurses Note -- 4 Eyes      11/24/2023   7:08 AM      Skin assessed during: Admit      [x] No Altered Skin Integrity Present    [x]Prevention Measures Documented      [] Yes- Altered Skin Integrity Present or Discovered   [] LDA Added if Not in Epic (Describe Wound)   [] New Altered Skin Integrity was Present on Admit and Documented in LDA   [] Wound Image Taken    Wound Care Consulted? No    Attending Nurse:  julai Guerra RN/Staff Member:   [id number]

## 2023-11-24 NOTE — PLAN OF CARE
[facility]  Initial Discharge Assessment       Primary Care Provider: No, Primary Doctor    Admission Diagnosis: Nausea and vomiting, unspecified vomiting type [R11.2]    Admission Date: 11/23/2023  Expected Discharge Date: 11/28/2023  Met with patient and Luci Mckenzie/significant other [phone] at bedside to complete assessment. No POA, living will or advance directive. No HH, HD, DME or Coumadin. Patient is requesting to go to [facility] in [facility] when discharged to address alcohol abuse. Patient's significant other will bring patient home when she is discharged. SW will assist with case management referrals/needs as needed.    Transition of Care Barriers: Alcohol Abuse    Payor: MEDICAID / Plan: HEALTHY BLUE ([employer] / Product Type: Managed Medicaid /     Extended Emergency Contact Information  Primary Emergency Contact: Luci Mckenzie  Mobile Phone: [phone]  Relation: Significant other  Preferred language: English   needed? No    Discharge Plan A: Home with family  Discharge Plan B: Home with family    No Pharmacies Listed    Initial Assessment (most recent)       Adult Discharge Assessment - 11/24/23 1353          Discharge Assessment    Assessment Type Discharge Planning Assessment     Confirmed/corrected address, phone number and insurance Yes     Confirmed Demographics Correct on Facesheet     Source of Information patient;family     When was your last doctors appointment? --   three months ago    Communicated SHANIQUE with patient/caregiver Yes     Reason For Admission Hypokalemia     People in Home significant other     Facility Arrived From: home     Do you expect to return to your current living situation? Yes     Do you have help at home or someone to help you manage your care at home? Yes     Who are your caregiver(s) and their phone number(s)? Luci Mckenzie/significant other [phone]     Prior to hospitilization cognitive status:  Alert/Oriented     Current cognitive status: Alert/Oriented     Equipment Currently Used at Home none     Readmission within 30 days? No     Patient currently being followed by outpatient case management? No     Do you currently have service(s) that help you manage your care at home? No     Do you take prescription medications? Yes     Do you have prescription coverage? Yes     Coverage [employer] Medicaid     Do you have any problems affording any of your prescribed medications? No     Is the patient taking medications as prescribed? yes     Who is going to help you get home at discharge? Luci Mckenzie/significant other [phone]     How do you get to doctors appointments? family or friend will provide;car, drives self     Are you on dialysis? No     Do you take coumadin? No     DME Needed Upon Discharge  none     Discharge Plan discussed with: Spouse/sig other;Patient     Name(s) and Number(s) Luci Mckenzie/significant other [phone]     Transition of Care Barriers Substance Abuse     Discharge Plan A Home with family     Discharge Plan B Home with family        OTHER    Name(s) of People in Home Luci Mckenzie/significant other [phone]

## 2023-11-24 NOTE — NURSING
Patient arrived to unit via stretcher from ED. Patient ambulated to bed without difficulty. Patient oriented to room, bed in lowest position, wheels locked, bed rails up x2, bed alarm initiated, call light and bedside table within reach. Safety measures addressed. Family at bedside.

## 2023-11-24 NOTE — HPI
HPI: 42 y/o M drinks 5th of vodka daily, 1PPD smoking, presents to the ER for vomiting starting yesterday.  He has been  unable to tolerate solids.  His last drink was yesterday. Sister told him to come in.      In ER was with tachycardia, hypokalemia, elevated transaminase, hyponatremic     Denies past medical history  NKDA

## 2023-11-24 NOTE — CONSULTS
GASTROENTEROLOGY INPATIENT CONSULT NOTE  Patient Name: Herson Araiza  Patient MRN: [id number]  Patient : 1980    Admit Date: 2023  Service date: 2023    Reason for Consult: anemia, possible cirrhosis      PCP: No, Primary Doctor    Chief Complaint   Patient presents with    Emesis     Vomiting starting today, pt reports he usually drinks daily, last drink yesterday       HPI: Patient is a 43 y.o. male with PMHx  EtOH Abuse and depression who presents with intractable nausea and vomiting for days but especially bad yesterday. Drinks almost a fifth of liquor daily since May after being laid off from his job. Denies hematemesis, melena, hematochezia. Food getting stuck mid-chest when swallowing. Denies abdominal pain. No previous EGD. Set up for colonoscopy with outside provider at end of the month but says they didn't want to set up for EGD same day but unaware of reasoning.    Past Medical History:  EtOH Abuse  Depression    Past Surgical History:  None    Home Medications:  No medications prior to admission.       Inpatient Medications:   chlordiazepoxide  5 mg Oral TID    pantoprazole  40 mg Intravenous BID    thiamine  100 mg Oral Daily     dextrose 50%, dextrose 50%, glucagon (human recombinant), glucose, glucose, lorazepam, lorazepam, lorazepam, magnesium sulfate IVPB, magnesium sulfate IVPB, naloxone, potassium bicarbonate, potassium bicarbonate, potassium bicarbonate, potassium bicarbonate, potassium bicarbonate, potassium bicarbonate, potassium, sodium phosphates, potassium, sodium phosphates, potassium, sodium phosphates, promethazine (PHENERGAN) 25 mg in dextrose 5 % (D5W) 50 mL IVPB, sodium chloride 0.9%    Review of patient's allergies indicates:   Allergen Reactions    Wellbutrin [bupropion hcl] Nausea And Vomiting       Social History:   Drinks 3/4 of a fifth of liquor daily since May 2023      Family History:   Denies FH of colon cancer. 2nd degree relatvie on father's side with  "gastric CA    Review of Systems:  Review of Systems   Constitutional:  Positive for malaise/fatigue. Negative for chills and fever.   HENT:  Negative for nosebleeds and sore throat.    Eyes:  Negative for pain and redness.   Respiratory:  Negative for shortness of breath and wheezing.    Cardiovascular:  Negative for chest pain and leg swelling.   Gastrointestinal:  Positive for heartburn, nausea and vomiting. Negative for abdominal pain, blood in stool, constipation, diarrhea and melena.   Genitourinary:  Negative for dysuria and hematuria.   Musculoskeletal:  Negative for falls and myalgias.   Skin:  Negative for itching and rash.   Neurological:  Negative for focal weakness and loss of consciousness.       OBJECTIVE:    Physical Exam:  24 Hour Vital Sign Ranges: Temp:  [98.3 °F (36.8 °C)-98.4 °F (36.9 °C)] 98.4 °F (36.9 °C)  Pulse:  [] 111  Resp:  [15-20] 16  SpO2:  [97 %-100 %] 97 %  BP: ()/(64-98) 95/64  Most recent vitals: BP 95/64 (BP Location: Right arm, Patient Position: Lying)   Pulse (!) 111   Temp 98.4 °F (36.9 °C) (Oral)   Resp 16   Ht 5' 6" (1.676 m)   Wt 60.3 kg (133 lb)   SpO2 97%   BMI 21.47 kg/m²    CONSTITUTIONAL: laying in bed, NAD  Eyes: PERRL, anicteric conjunctivae  ENT: nares patent, oral mucosa pink and moist without lesion  NECK: trachea midline; Good ROM  CV: regular rate and rhythm, no murmurs or gallops  RESP: clear to auscultation bilaterally, no wheezes, rhonci or rales  ABD: soft, non-tender, non-distended, normal bowel sounds  MSK: no swelling or edema, 2+ pulses distally  INTEGUMENT: warm/dry, no rashes or jaundice on limited skin exam  NEURO: appropriately conversant, no asterixis  PSYCH: normal affect    Labs:   I have personally reviewed the pertinent/available labs in [facility].     Recent Labs     11/23/23 2026 11/24/23  0513   WBC 5.64 5.46   * 111*   PLT 93* 77*     Recent Labs     11/23/23 2026 11/24/23  0513   * 135*   K 2.8* 3.3*   CL 98 105 " "  CO2 18* 21*   BUN 5* 3*   * 90     No results for input(s): "ALB" in the last 72 hours.    Invalid input(s): "ALKP", "SGOT", "SGPT", "TBIL", "DBIL", "TPRO"  Recent Labs     11/24/23  0513   INR 1.3*         Radiology Review:  I have personally reviewed the recent available imaging in [facility].    US Abdomen Limited   Final Result          Endoscopy:  I have personally reviewed and interpreted the reports and images from the endoscopy reports available in Epic.      IMPRESSION / RECOMMENDATIONS:  EtOH Abuse  Macrocytic Anemia  EtOH Hepatitis  Hypokalemia  Hypocalcemia  Metabolic Acidosis  Thrombocytopenia  Nausea and Vomiting  - LFT pattern consistent with EtOH hepatitis.MDF 19.5 so steroids not indicated and good prognosis  - CIWA protocol  - replace electrolytes as needed  - B12 and Folate wnl  - anemia and thrombocytopenia possible 2/2 bone marrow suppression/nutritional deficiencies in setting of chronic EtOH Abuse  - US Abdomen with increased echogenicity but no report of cirrhotic changes. Spleen not measured to assess for signs fo portal HTN  - discussed need for alcohol cessation with patient. Dion working on setting of outpatient addiction psych  - has colonoscopy scheduled outpatient for 11/30  - in setting of anemia with chronic alcohol use, plan for EGD to assess for bleeding/varices. Unable to do today because SLP performed bedside swallow evaluation despite patient having NPO orders in chart. Can plan for tomorrow. Diet todaya nd NPO at midnight  - I have discussed the risks, benefits, and alternatives of the procedure in detail with the patient. The risks include pain, discomfort, bleeding, infection, perforation, missed lesions or missed cancer, sedation/anesthesia risks, and even death. The benefit of the procedure is that it allows an evaluation of the reported problem or issue and possible intervention. The alternatives include not having the procedure or an alternative evaluation by " another method.  The patient was given the opportunity to ask questions and they were answered. Informed consent was obtained.  - increased risk factors include chronic EtOH Abuse, electrolyte derangements    Discussed treatment plan with Dr Lorenzo    Thank you for this consult.    Wojciech Paz  11/24/2023  10:08 AM

## 2023-11-24 NOTE — PLAN OF CARE
Problem: SLP  Goal: SLP Goal  Description: 1. Pt will tolerate least restrictive diet w/o overt s/s of aspiration.   Outcome: Met     B/s swallow evaluation completed. No further ST services indicated.

## 2023-11-24 NOTE — H&P
[facility] - Emergency Dept    History & Physical      Patient Name: Herson Araiza  MRN: [id number]  Admission Date: 11/23/2023  Attending Physician: Kike Weeks MD   Primary Care Provider: Norma, Primary Doctor         Patient information was obtained from patient, past medical records, and ER records.     Subjective:     Principal Problem:Hypokalemia    Chief Complaint:   Chief Complaint   Patient presents with    Emesis     Vomiting starting today, pt reports he usually drinks daily, last drink yesterday        HPI: 42 y/o M drinks 5th of vodka daily, 1PPD smoking, presents to the ER for vomiting starting yesterday.  He has been  unable to tolerate solids.  His last drink was yesterday. Sister told him to come in.     In ER was with tachycardia, hypokalemia, elevated transaminase, hyponatremic    Denies past medical history  NKDA       Review of patient's allergies indicates:   Allergen Reactions    Wellbutrin [bupropion hcl] Nausea And Vomiting       No current facility-administered medications on file prior to encounter.     No current outpatient medications on file prior to encounter.     Family History    None       Tobacco Use    Smoking status: Not on file    Smokeless tobacco: Not on file   Substance and Sexual Activity    Alcohol use: Not on file    Drug use: Not on file    Sexual activity: Not on file     Review of Systems  Objective:     Vital Signs (Most Recent):  Temp: 98.3 °F (36.8 °C) (11/23/23 1953)  Pulse: 98 (11/23/23 2008)  Resp: 20 (11/23/23 2008)  BP: (!) 143/98 (11/23/23 1958)  SpO2: 100 % (11/23/23 2008) Vital Signs (24h Range):  Temp:  [98.3 °F (36.8 °C)] 98.3 °F (36.8 °C)  Pulse:  [] 98  Resp:  [16-20] 20  SpO2:  [100 %] 100 %  BP: (141-143)/(95-98) 143/98     Weight: 60.3 kg (133 lb)  Body mass index is 21.47 kg/m².    Physical Exam  HENT:      Mouth/Throat:      Mouth: Mucous membranes are dry.   Cardiovascular:      Rate and Rhythm: Normal rate and regular  rhythm.      Pulses: Normal pulses.      Heart sounds: Normal heart sounds.   Pulmonary:      Effort: Pulmonary effort is normal.      Breath sounds: Normal breath sounds.   Abdominal:      General: Abdomen is flat.      Palpations: Abdomen is soft.   Skin:     General: Skin is warm.      Capillary Refill: Capillary refill takes less than 2 seconds.   Neurological:      General: No focal deficit present.      Mental Status: He is alert.            Significant Labs: All pertinent labs within the past 24 hours have been reviewed.    Significant Imaging: I have reviewed all pertinent imaging results/findings within the past 24 hours.    Assessment/Plan:     Active Diagnoses:    Diagnosis Date Noted POA    PRINCIPAL PROBLEM:  Hypokalemia [E87.6] 11/23/2023 Unknown      Problems Resolved During this Admission:     VTE Risk Mitigation (From admission, onward)           Ordered     IP VTE LOW RISK PATIENT  Once         11/23/23 2303     Place sequential compression device  Until discontinued         11/23/23 2303                  #Nausea and vomiting, POA  -Possible peptic ulcer versus alcohol-related  #Hypokalemia, hypomagnesemia  #Macrocytic Anemia  #Thrombocytopenia, r/o cirrhosis  #Hyponatremia  #Anion gap metabolic acidosis  -possibly starvation ketosis  #Elevated LFTs, Tbili    Antiemetics as ordered  IV fluid, Ppi  Replace potassium, magnesium  Rapid quadrant ultrasound  CIWA protocol, p.r.n Ativan, nutritional support  Check INR/Ammonia, r/o cirrhosis  Check iron studies, FOBT, B12/folate  Check lactic acid, serum ketones  Consult GI, NPO  SCDs        Kike Weeks MD  Department of Hospital Medicine   [facility]

## 2023-11-24 NOTE — PT/OT/SLP EVAL
Speech Language Pathology Evaluation  Bedside Swallow    Patient Name:  Herson Araiza   MRN:  [id number]  Admitting Diagnosis: Hypokalemia    Recommendations:                 General Recommendations:  Follow-up not indicated  Diet recommendations:  Regular Diet - IDDSI Level 7, Thin   Aspiration Precautions: Alternating bites/sips, Frequent oral care, HOB to 90 degrees, and Meds whole 1 at a time   General Precautions: Standard    Communication strategies:  none    Assessment:     Herson Araiza is a 43 y.o. male with a medical diagnosis Hypokalemia. He presents with adequate swallowing function for continuation of regular consistency PO diet. No further ST services warranted at this time.     History:     No past medical history on file.    No past surgical history on file.    Subjective     Pt responded to environmental stimuli and became awake/alert.     Patient goals: To continue safe consumption on PO intake.      Pain/Comfort:  Pain Rating 1: 0/10    Respiratory Status: Room air    Objective:     Oral Musculature Evaluation  Oral Musculature: WFL  Dentition: present and adequate  Secretion Management: adequate  Mucosal Quality: adequate  Mandibular Strength and Mobility: WFL  Oral Labial Strength and Mobility: WFL  Lingual Strength and Mobility: WFL  Velar Elevation: WFL  Buccal Strength and Mobility: WFL  Volitional Cough: WFL  Volitional Swallow: WFL  Voice Prior to PO Intake: Clear    Bedside Swallow Eval:   Consistencies Assessed:  Thin liquids - 3oz via cup edge, straw  Puree - 3tsp   Soft solids - x3      Oral Phase:   WFL    Pharyngeal Phase:   no overt clinical signs/symptoms of aspiration  no overt clinical signs/symptoms of pharyngeal dysphagia    Compensatory Strategies  None    Treatment: B/s swallow evaluation completed.     Goals:   Multidisciplinary Problems       SLP Goals       Not on file              Multidisciplinary Problems (Resolved)          Problem: SLP    Goal Priority Disciplines  Outcome   SLP Goal   (Resolved)     SLP Met   Description: 1. Pt will tolerate least restrictive diet w/o overt s/s of aspiration.                        Plan:     Patient to be seen:      Plan of Care expires:     Plan of Care reviewed with:  patient   SLP Follow-Up:  No       Discharge recommendations:  No Therapy Indicated   Barriers to Discharge:  None    Time Tracking:     SLP Treatment Date:   11/24/23  Speech Start Time:  0840  Speech Stop Time:  0852     Speech Total Time (min):  12 min    Billable Minutes: Eval Swallow and Oral Function 12min    11/24/2023

## 2023-11-25 ENCOUNTER — ANESTHESIA (OUTPATIENT)
Dept: SURGERY | Facility: HOSPITAL | Age: 43
DRG: 641 | End: 2023-11-25
Payer: MEDICAID

## 2023-11-25 ENCOUNTER — ANESTHESIA EVENT (OUTPATIENT)
Dept: SURGERY | Facility: HOSPITAL | Age: 43
DRG: 641 | End: 2023-11-25
Payer: MEDICAID

## 2023-11-25 VITALS
DIASTOLIC BLOOD PRESSURE: 55 MMHG | HEART RATE: 106 BPM | SYSTOLIC BLOOD PRESSURE: 92 MMHG | RESPIRATION RATE: 12 BRPM | OXYGEN SATURATION: 100 %

## 2023-11-25 LAB
ALBUMIN SERPL BCP-MCNC: 2 G/DL (ref 3.5–5.2)
ALP SERPL-CCNC: 189 U/L (ref 55–135)
ALT SERPL W/O P-5'-P-CCNC: 42 U/L (ref 10–44)
ANION GAP SERPL CALC-SCNC: 5 MMOL/L (ref 8–16)
AST SERPL-CCNC: 134 U/L (ref 10–40)
BASOPHILS # BLD AUTO: 0.02 K/UL (ref 0–0.2)
BASOPHILS NFR BLD: 0.4 % (ref 0–1.9)
BILIRUB SERPL-MCNC: 3.2 MG/DL (ref 0.1–1)
BUN SERPL-MCNC: 2 MG/DL (ref 6–20)
CALCIUM SERPL-MCNC: 6.7 MG/DL (ref 8.7–10.5)
CHLORIDE SERPL-SCNC: 106 MMOL/L (ref 95–110)
CO2 SERPL-SCNC: 23 MMOL/L (ref 23–29)
CREAT SERPL-MCNC: 0.4 MG/DL (ref 0.5–1.4)
DIFFERENTIAL METHOD: ABNORMAL
EOSINOPHIL # BLD AUTO: 0.1 K/UL (ref 0–0.5)
EOSINOPHIL NFR BLD: 2.3 % (ref 0–8)
ERYTHROCYTE [DISTWIDTH] IN BLOOD BY AUTOMATED COUNT: 14.6 % (ref 11.5–14.5)
EST. GFR  (NO RACE VARIABLE): >60 ML/MIN/1.73 M^2
GLUCOSE SERPL-MCNC: 79 MG/DL (ref 70–110)
HCT VFR BLD AUTO: 25.4 % (ref 40–54)
HGB BLD-MCNC: 8.6 G/DL (ref 14–18)
IMM GRANULOCYTES # BLD AUTO: 0.01 K/UL (ref 0–0.04)
IMM GRANULOCYTES NFR BLD AUTO: 0.2 % (ref 0–0.5)
LYMPHOCYTES # BLD AUTO: 1.5 K/UL (ref 1–4.8)
LYMPHOCYTES NFR BLD: 30.2 % (ref 18–48)
MCH RBC QN AUTO: 39.3 PG (ref 27–31)
MCHC RBC AUTO-ENTMCNC: 33.9 G/DL (ref 32–36)
MCV RBC AUTO: 116 FL (ref 82–98)
MONOCYTES # BLD AUTO: 0.5 K/UL (ref 0.3–1)
MONOCYTES NFR BLD: 9.9 % (ref 4–15)
NEUTROPHILS # BLD AUTO: 2.8 K/UL (ref 1.8–7.7)
NEUTROPHILS NFR BLD: 57 % (ref 38–73)
NRBC BLD-RTO: 0 /100 WBC
PLATELET # BLD AUTO: 82 K/UL (ref 150–450)
PMV BLD AUTO: 12.6 FL (ref 9.2–12.9)
POTASSIUM SERPL-SCNC: 3.4 MMOL/L (ref 3.5–5.1)
PROT SERPL-MCNC: 5 G/DL (ref 6–8.4)
RBC # BLD AUTO: 2.19 M/UL (ref 4.6–6.2)
SODIUM SERPL-SCNC: 134 MMOL/L (ref 136–145)
TSH SERPL DL<=0.005 MIU/L-ACNC: 1.87 UIU/ML (ref 0.34–5.6)
WBC # BLD AUTO: 4.87 K/UL (ref 3.9–12.7)

## 2023-11-25 PROCEDURE — 37000008 HC ANESTHESIA 1ST 15 MINUTES: Performed by: STUDENT IN AN ORGANIZED HEALTH CARE EDUCATION/TRAINING PROGRAM

## 2023-11-25 PROCEDURE — 43450 DILATE ESOPHAGUS 1/MULT PASS: CPT | Performed by: STUDENT IN AN ORGANIZED HEALTH CARE EDUCATION/TRAINING PROGRAM

## 2023-11-25 PROCEDURE — 25000003 PHARM REV CODE 250: Performed by: NURSE ANESTHETIST, CERTIFIED REGISTERED

## 2023-11-25 PROCEDURE — 37000009 HC ANESTHESIA EA ADD 15 MINS: Performed by: STUDENT IN AN ORGANIZED HEALTH CARE EDUCATION/TRAINING PROGRAM

## 2023-11-25 PROCEDURE — 25000003 PHARM REV CODE 250: Performed by: INTERNAL MEDICINE

## 2023-11-25 PROCEDURE — 63600175 PHARM REV CODE 636 W HCPCS: Performed by: INTERNAL MEDICINE

## 2023-11-25 PROCEDURE — 36415 COLL VENOUS BLD VENIPUNCTURE: CPT | Performed by: INTERNAL MEDICINE

## 2023-11-25 PROCEDURE — 43239 EGD BIOPSY SINGLE/MULTIPLE: CPT | Performed by: STUDENT IN AN ORGANIZED HEALTH CARE EDUCATION/TRAINING PROGRAM

## 2023-11-25 PROCEDURE — 84443 ASSAY THYROID STIM HORMONE: CPT | Performed by: INTERNAL MEDICINE

## 2023-11-25 PROCEDURE — 25000003 PHARM REV CODE 250: Performed by: STUDENT IN AN ORGANIZED HEALTH CARE EDUCATION/TRAINING PROGRAM

## 2023-11-25 PROCEDURE — 21400001 HC TELEMETRY ROOM

## 2023-11-25 PROCEDURE — 80053 COMPREHEN METABOLIC PANEL: CPT | Performed by: INTERNAL MEDICINE

## 2023-11-25 PROCEDURE — 63600175 PHARM REV CODE 636 W HCPCS: Performed by: NURSE ANESTHETIST, CERTIFIED REGISTERED

## 2023-11-25 PROCEDURE — 27200043 HC FORCEPS, BIOPSY: Performed by: STUDENT IN AN ORGANIZED HEALTH CARE EDUCATION/TRAINING PROGRAM

## 2023-11-25 PROCEDURE — 85025 COMPLETE CBC W/AUTO DIFF WBC: CPT | Performed by: INTERNAL MEDICINE

## 2023-11-25 PROCEDURE — D9220A PRA ANESTHESIA: ICD-10-PCS | Mod: ,,, | Performed by: ANESTHESIOLOGY

## 2023-11-25 PROCEDURE — S4991 NICOTINE PATCH NONLEGEND: HCPCS | Performed by: STUDENT IN AN ORGANIZED HEALTH CARE EDUCATION/TRAINING PROGRAM

## 2023-11-25 PROCEDURE — D9220A PRA ANESTHESIA: Mod: ,,, | Performed by: ANESTHESIOLOGY

## 2023-11-25 RX ORDER — SUCRALFATE 1 G/10ML
1 SUSPENSION ORAL EVERY 6 HOURS
Status: DISCONTINUED | OUTPATIENT
Start: 2023-11-25 | End: 2023-11-27 | Stop reason: HOSPADM

## 2023-11-25 RX ORDER — LIDOCAINE HYDROCHLORIDE 20 MG/ML
5 SOLUTION OROPHARYNGEAL EVERY 6 HOURS PRN
Status: DISCONTINUED | OUTPATIENT
Start: 2023-11-25 | End: 2023-11-27 | Stop reason: HOSPADM

## 2023-11-25 RX ORDER — PROPOFOL 10 MG/ML
VIAL (ML) INTRAVENOUS
Status: DISCONTINUED | OUTPATIENT
Start: 2023-11-25 | End: 2023-11-25

## 2023-11-25 RX ORDER — SODIUM CHLORIDE 9 MG/ML
INJECTION, SOLUTION INTRAVENOUS CONTINUOUS
Status: DISCONTINUED | OUTPATIENT
Start: 2023-11-25 | End: 2023-11-27 | Stop reason: HOSPADM

## 2023-11-25 RX ORDER — PANTOPRAZOLE SODIUM 40 MG/1
40 TABLET, DELAYED RELEASE ORAL 2 TIMES DAILY
Status: DISCONTINUED | OUTPATIENT
Start: 2023-11-25 | End: 2023-11-27 | Stop reason: HOSPADM

## 2023-11-25 RX ORDER — CALCIUM GLUCONATE 20 MG/ML
1 INJECTION, SOLUTION INTRAVENOUS ONCE
Status: COMPLETED | OUTPATIENT
Start: 2023-11-25 | End: 2023-11-25

## 2023-11-25 RX ORDER — CHLORDIAZEPOXIDE HYDROCHLORIDE 5 MG/1
10 CAPSULE, GELATIN COATED ORAL 3 TIMES DAILY
Status: DISCONTINUED | OUTPATIENT
Start: 2023-11-25 | End: 2023-11-26

## 2023-11-25 RX ADMIN — THIAMINE HCL TAB 100 MG 100 MG: 100 TAB at 10:11

## 2023-11-25 RX ADMIN — LIDOCAINE HYDROCHLORIDE 5 ML: 20 SOLUTION ORAL at 01:11

## 2023-11-25 RX ADMIN — OXYCODONE HYDROCHLORIDE 5 MG: 5 TABLET ORAL at 03:11

## 2023-11-25 RX ADMIN — SODIUM CHLORIDE: 900 INJECTION, SOLUTION INTRAVENOUS at 08:11

## 2023-11-25 RX ADMIN — PROPOFOL 50 MG: 10 INJECTION, EMULSION INTRAVENOUS at 09:11

## 2023-11-25 RX ADMIN — LORAZEPAM 1 MG: 2 INJECTION, SOLUTION INTRAMUSCULAR; INTRAVENOUS at 08:11

## 2023-11-25 RX ADMIN — LORAZEPAM 0.5 MG: 2 INJECTION, SOLUTION INTRAMUSCULAR; INTRAVENOUS at 04:11

## 2023-11-25 RX ADMIN — SUCRALFATE 1 G: 1 SUSPENSION ORAL at 02:11

## 2023-11-25 RX ADMIN — PANTOPRAZOLE SODIUM 40 MG: 40 TABLET, DELAYED RELEASE ORAL at 10:11

## 2023-11-25 RX ADMIN — NICOTINE 1 PATCH: 14 PATCH, EXTENDED RELEASE TRANSDERMAL at 10:11

## 2023-11-25 RX ADMIN — SUCRALFATE 1 G: 1 SUSPENSION ORAL at 07:11

## 2023-11-25 RX ADMIN — CITALOPRAM HYDROBROMIDE 10 MG: 10 TABLET ORAL at 10:11

## 2023-11-25 RX ADMIN — CALCIUM GLUCONATE 1 G: 20 INJECTION, SOLUTION INTRAVENOUS at 06:11

## 2023-11-25 RX ADMIN — SODIUM CHLORIDE 250 ML: 0.9 INJECTION, SOLUTION INTRAVENOUS at 10:11

## 2023-11-25 RX ADMIN — OXYCODONE HYDROCHLORIDE 5 MG: 5 TABLET ORAL at 10:11

## 2023-11-25 RX ADMIN — CHLORDIAZEPOXIDE HYDROCHLORIDE 10 MG: 5 CAPSULE ORAL at 03:11

## 2023-11-25 RX ADMIN — SODIUM CHLORIDE: 0.9 INJECTION, SOLUTION INTRAVENOUS at 10:11

## 2023-11-25 RX ADMIN — SODIUM CHLORIDE: 0.9 INJECTION, SOLUTION INTRAVENOUS at 11:11

## 2023-11-25 RX ADMIN — PANTOPRAZOLE SODIUM 40 MG: 40 TABLET, DELAYED RELEASE ORAL at 08:11

## 2023-11-25 RX ADMIN — LIDOCAINE HYDROCHLORIDE 5 ML: 20 SOLUTION ORAL at 10:11

## 2023-11-25 RX ADMIN — CHLORDIAZEPOXIDE HYDROCHLORIDE 10 MG: 5 CAPSULE ORAL at 10:11

## 2023-11-25 RX ADMIN — CHLORDIAZEPOXIDE HYDROCHLORIDE 10 MG: 5 CAPSULE ORAL at 08:11

## 2023-11-25 RX ADMIN — PROPOFOL 80 MG: 10 INJECTION, EMULSION INTRAVENOUS at 08:11

## 2023-11-25 RX ADMIN — PROPOFOL 70 MG: 10 INJECTION, EMULSION INTRAVENOUS at 08:11

## 2023-11-25 NOTE — [employer] PATIENT INSTRUCTIONS
Discharge Summary/Instructions after an Endoscopic Procedure  Patient Name: Herson Araiza  Patient MRN: [id number]  Patient YOB: 1980 Saturday, November 25, 2023  Wojciech Paz MD  RESTRICTIONS:  During your procedure today, you received medications for sedation.  These   medications may affect your judgment, balance and coordination.  Therefore,   for 24 hours, you have the following restrictions:   - DO NOT drive a car, operate machinery, make legal/financial decisions,   sign important papers or drink alcohol.    ACTIVITY:  Today: no heavy lifting, straining or running due to procedural   sedation/anesthesia.  The following day: return to full activity including work.  DIET:  Eat and drink normally unless instructed otherwise.     TREATMENT FOR COMMON SIDE EFFECTS:  - Mild abdominal pain, nausea, belching, bloating or excessive gas:  rest,   eat lightly and use a heating pad.  - Sore Throat: treat with throat lozenges and/or gargle with warm salt   water.  - Because air was used during the procedure, expelling large amounts of air   from your rectum or belching is normal.  - If a bowel prep was taken, you may not have a bowel movement for 1-3 days.    This is normal.  SYMPTOMS TO WATCH FOR AND REPORT TO YOUR PHYSICIAN:  1. Abdominal pain or bloating, other than gas cramps.  2. Chest pain.  3. Back pain.  4. Signs of infection such as: chills or fever occurring within 24 hours   after the procedure.  5. Rectal bleeding, which would show as bright red, maroon, or black stools.   (A tablespoon of blood from the rectum is not serious, especially if   hemorrhoids are present.)  6. Vomiting.  7. Weakness or dizziness.  GO DIRECTLY TO THE NEAREST EMERGENCY ROOM IF YOU HAVE ANY OF THE FOLLOWING:      Difficulty breathing              Chills and/or fever over 101 F   Persistent vomiting and/or vomiting blood   Severe abdominal pain   Severe chest pain   Black, tarry stools   Bleeding- more than one  tablespoon   Any other symptom or condition that you feel may need urgent attention  Your doctor recommends these additional instructions:  If any biopsies were taken, your doctors clinic will contact you in 1 to 2   weeks with any results.  - Patient has a contact number available for emergencies.  The signs and   symptoms of potential delayed complications were discussed with the   patient.  Return to normal activities tomorrow.  Written discharge   instructions were provided to the patient.   - Discharge patient to home.   - Advance diet as tolerated.   - Use sucralfate suspension 1 gram PO QID for 1 week.   - Use Protonix (pantoprazole) 40 mg PO BID for 8 weeks.   - Await pathology results.   - Perform a colonoscopy as previously scheduled.  For questions, problems or results please call your physician - Wojciech Paz MD at Work:  [phone].  [facility], EMERGENCY ROOM PHONE NUMBER: [phone]  IF A COMPLICATION OR EMERGENCY SITUATION ARISES AND YOU ARE UNABLE TO REACH   YOUR PHYSICIAN - GO DIRECTLY TO THE EMERGENCY ROOM.  Wojciech Paz MD  11/25/2023 9:21:21 AM  This report has been verified and signed electronically.  Dear patient,  As a result of recent federal legislation (The Federal Cures Act), you may   receive lab or pathology results from your procedure in your [facility]   account before your physician is able to contact you. Your physician or   their representative will relay the results to you with their   recommendations at their soonest availability.  Thank you,  [employer]

## 2023-11-25 NOTE — TRANSFER OF CARE
"Anesthesia Transfer of Care Note    Patient: Herson Araiza    Procedure(s) Performed: Procedure(s) (LRB):  EGD (ESOPHAGOGASTRODUODENOSCOPY) (N/A)    Patient location: GI    Anesthesia Type: general    Transport from OR: Transported from OR on 2-3 L/min O2 by NC with adequate spontaneous ventilation    Post pain: adequate analgesia    Post assessment: no apparent anesthetic complications    Post vital signs: stable    Level of consciousness: awake    Nausea/Vomiting: no nausea/vomiting    Complications: none    Transfer of care protocol was followed      Last vitals: Visit Vitals  /66   Pulse 91   Temp 36.9 °C (98.5 °F)   Resp 15   Ht 5' 6" (1.676 m)   Wt 64.8 kg (142 lb 13.7 oz)   SpO2 100%   BMI 23.06 kg/m²     "

## 2023-11-25 NOTE — ANESTHESIA POSTPROCEDURE EVALUATION
Anesthesia Post Evaluation    Patient: Herson Araiza    Procedure(s) Performed: Procedure(s) (LRB):  EGD (ESOPHAGOGASTRODUODENOSCOPY) (N/A)    Final Anesthesia Type: general      Patient location during evaluation: GI PACU  Patient participation: Yes- Able to Participate  Level of consciousness: awake and alert, oriented and awake  Post-procedure vital signs: reviewed and stable  Pain management: adequate  Airway patency: patent    PONV status at discharge: No PONV  Anesthetic complications: no      Cardiovascular status: blood pressure returned to baseline, hemodynamically stable and stable  Respiratory status: unassisted, spontaneous ventilation and room air  Hydration status: euvolemic  Follow-up not needed.          Vitals Value Taken Time   /63 11/25/23 0915   Temp 36.6 °C (97.8 °F) 11/25/23 0915   Pulse 108 11/25/23 0919   Resp 16 11/25/23 0919   SpO2  95 % 11/25/23 0919   Vitals shown include unvalidated device data.      No case tracking events are documented in the log.      Pain/Julieta Score: Pain Rating Prior to Med Admin: 5 (11/24/2023  3:14 PM)

## 2023-11-25 NOTE — PLAN OF CARE
Problem: Adult Inpatient Plan of Care  Goal: Plan of Care Review  Outcome: Ongoing, Progressing  Goal: Optimal Comfort and Wellbeing  Outcome: Ongoing, Progressing     Problem: Electrolyte Imbalance  Goal: Electrolyte Balance  Outcome: Ongoing, Progressing

## 2023-11-25 NOTE — SUBJECTIVE & OBJECTIVE
Interval History: probable early DT's; for EGD this AM    Review of Systems   Constitutional:  Positive for fatigue.   HENT: Negative.     Eyes: Negative.    Respiratory: Negative.     Cardiovascular: Negative.    Gastrointestinal: Negative.    Endocrine: Negative.    Genitourinary: Negative.    Musculoskeletal: Negative.    Skin: Negative.    Allergic/Immunologic: Negative.    Neurological: Negative.    Hematological: Negative.    Psychiatric/Behavioral:  Positive for confusion.    All other systems reviewed and are negative.    Objective:     Vital Signs (Most Recent):  Temp: 98.5 °F (36.9 °C) (11/25/23 0319)  Pulse: 101 (11/25/23 0319)  Resp: 17 (11/25/23 0319)  BP: (!) 98/52 (11/25/23 0319)  SpO2: 98 % (11/25/23 0319) Vital Signs (24h Range):  Temp:  [98.2 °F (36.8 °C)-98.8 °F (37.1 °C)] 98.5 °F (36.9 °C)  Pulse:  [] 101  Resp:  [16-18] 17  SpO2:  [98 %-100 %] 98 %  BP: ()/(52-77) 98/52     Weight: 64.8 kg (142 lb 13.7 oz)  Body mass index is 23.06 kg/m².    Intake/Output Summary (Last 24 hours) at 11/25/2023 0803  Last data filed at 11/25/2023 0321  Gross per 24 hour   Intake 0 ml   Output 275 ml   Net -275 ml         Physical Exam  Vitals and nursing note reviewed.   Constitutional:       Appearance: He is well-developed.   HENT:      Head: Normocephalic and atraumatic.      Right Ear: External ear normal.      Left Ear: External ear normal.      Nose: Nose normal.   Eyes:      Conjunctiva/sclera: Conjunctivae normal.      Pupils: Pupils are equal, round, and reactive to light.   Cardiovascular:      Rate and Rhythm: Normal rate and regular rhythm.      Heart sounds: Normal heart sounds.   Pulmonary:      Effort: Pulmonary effort is normal.      Breath sounds: Normal breath sounds.   Abdominal:      General: Bowel sounds are normal.      Palpations: Abdomen is soft.   Musculoskeletal:         General: Normal range of motion.      Cervical back: Normal range of motion and neck supple.   Skin:      General: Skin is warm and dry.      Capillary Refill: Capillary refill takes less than 2 seconds.   Neurological:      Mental Status: He is alert and oriented to person, place, and time.      Comments: Fine tremor hands   Psychiatric:         Behavior: Behavior normal.         Thought Content: Thought content normal.         Judgment: Judgment normal.             Significant Labs: All pertinent labs within the past 24 hours have been reviewed.  CBC:   Recent Labs   Lab 11/23/23 2026 11/24/23 0513 11/25/23 0452   WBC 5.64 5.46 4.87   HGB 9.2* 8.2* 8.6*   HCT 26.5* 23.6* 25.4*   PLT 93* 77* 82*     CMP:   Recent Labs   Lab 11/23/23 2026 11/24/23 0513 11/25/23 0452   * 135* 134*   K 2.8* 3.3* 3.4*   CL 98 105 106   CO2 18* 21* 23   * 90 79   BUN 5* 3* 2*   CREATININE 0.5 0.4* 0.4*   CALCIUM 7.4* 6.9* 6.7*   PROT 5.8* 5.0* 5.0*   ALBUMIN 2.3* 2.0* 2.0*   BILITOT 2.8* 2.9* 3.2*   ALKPHOS 204* 189* 189*   * 147* 134*   ALT 52* 45* 42   ANIONGAP 18* 9 5*       Significant Imaging: I have reviewed all pertinent imaging results/findings within the past 24 hours.

## 2023-11-25 NOTE — PROGRESS NOTES
"[facility] Medicine  Progress Note    Patient Name: Herson Araiza  MRN: [id number]  Patient Class: IP- Inpatient   Admission Date: 11/23/2023  Length of Stay: 2 days  Attending Physician: Mukesh Lorenzo MD  Primary Care Provider: Norma, Primary Doctor        Subjective:     Principal Problem:Hypokalemia        HPI:  HPI: 44 y/o M drinks 5th of vodka daily, 1PPD smoking, presents to the ER for vomiting starting yesterday.  He has been  unable to tolerate solids.  His last drink was yesterday. Sister told him to come in.      In ER was with tachycardia, hypokalemia, elevated transaminase, hyponatremic     Denies past medical history  NKDA    Overview/Hospital Course:  11/24  Assumed care. Discussed with GI: was fed by SLP this AM--EGD not possible today; to be npo p midnight with EGD in AM. Labs reviewed and noted below: no leukocytosis with moderate macrocytic anemia; trivial hyponatremia with hypokalemia (sliding scale); mild hyperbilirubinemia with mild elevation of transaminases; lactate normal. Telemetry reviewed: sinus. Day 2 of no ETOH. Feels "OK". No tremor (yet). Wife at bedside: patient normally take citalopram 10 mg q day, which he is not currently on. Plan: NPO p midnight; continue current course; adding above citalopram; "Banana Bag" X 1 liter then back to NS; electrolyte sliding scale; TSH with AM labs for review.    11/25  Had some "Confusion" overnight according to wife at bedside and RN. Patient is A/O X 4 currently. Not aggressive/combative. No further N/V. Hands have started to develop very minimal tremor when compared to yesterday. Labs reviewed and noted below: no leukocytosis with fairly stable moderte macrocytic anemia; trivial hyponatremia, mild hypokalemia (on sliding scale) with normal renal function; hyperbilirubinemia continues to slowly rise, but elevated transaminases are falling; TSH is normal. BP has been soft overnight (high 90's-low 100's systolic). No CP/SOB. " For EGD this AM.  Plan:  ml bolus, then continue NS infusion; continue prn lorazepam; increase scheduled chlordiazepoxide; if BP improves will start clonidine for further withdrawal therapy; keep on telemetry currently, but will transfer out should Precedex become necessary--keep close eye on patient; AM labs for review    Interval History: probable early DT's; for EGD this AM    Review of Systems   Constitutional:  Positive for fatigue.   HENT: Negative.     Eyes: Negative.    Respiratory: Negative.     Cardiovascular: Negative.    Gastrointestinal: Negative.    Endocrine: Negative.    Genitourinary: Negative.    Musculoskeletal: Negative.    Skin: Negative.    Allergic/Immunologic: Negative.    Neurological: Negative.    Hematological: Negative.    Psychiatric/Behavioral:  Positive for confusion.    All other systems reviewed and are negative.    Objective:     Vital Signs (Most Recent):  Temp: 98.5 °F (36.9 °C) (11/25/23 0319)  Pulse: 101 (11/25/23 0319)  Resp: 17 (11/25/23 0319)  BP: (!) 98/52 (11/25/23 0319)  SpO2: 98 % (11/25/23 0319) Vital Signs (24h Range):  Temp:  [98.2 °F (36.8 °C)-98.8 °F (37.1 °C)] 98.5 °F (36.9 °C)  Pulse:  [] 101  Resp:  [16-18] 17  SpO2:  [98 %-100 %] 98 %  BP: ()/(52-77) 98/52     Weight: 64.8 kg (142 lb 13.7 oz)  Body mass index is 23.06 kg/m².    Intake/Output Summary (Last 24 hours) at 11/25/2023 0803  Last data filed at 11/25/2023 0321  Gross per 24 hour   Intake 0 ml   Output 275 ml   Net -275 ml         Physical Exam  Vitals and nursing note reviewed.   Constitutional:       Appearance: He is well-developed.   HENT:      Head: Normocephalic and atraumatic.      Right Ear: External ear normal.      Left Ear: External ear normal.      Nose: Nose normal.   Eyes:      Conjunctiva/sclera: Conjunctivae normal.      Pupils: Pupils are equal, round, and reactive to light.   Cardiovascular:      Rate and Rhythm: Normal rate and regular rhythm.      Heart sounds: Normal  heart sounds.   Pulmonary:      Effort: Pulmonary effort is normal.      Breath sounds: Normal breath sounds.   Abdominal:      General: Bowel sounds are normal.      Palpations: Abdomen is soft.   Musculoskeletal:         General: Normal range of motion.      Cervical back: Normal range of motion and neck supple.   Skin:     General: Skin is warm and dry.      Capillary Refill: Capillary refill takes less than 2 seconds.   Neurological:      Mental Status: He is alert and oriented to person, place, and time.      Comments: Fine tremor hands   Psychiatric:         Behavior: Behavior normal.         Thought Content: Thought content normal.         Judgment: Judgment normal.             Significant Labs: All pertinent labs within the past 24 hours have been reviewed.  CBC:   Recent Labs   Lab 11/23/23 2026 11/24/23  0513 11/25/23 0452   WBC 5.64 5.46 4.87   HGB 9.2* 8.2* 8.6*   HCT 26.5* 23.6* 25.4*   PLT 93* 77* 82*     CMP:   Recent Labs   Lab 11/23/23 2026 11/24/23 0513 11/25/23 0452   * 135* 134*   K 2.8* 3.3* 3.4*   CL 98 105 106   CO2 18* 21* 23   * 90 79   BUN 5* 3* 2*   CREATININE 0.5 0.4* 0.4*   CALCIUM 7.4* 6.9* 6.7*   PROT 5.8* 5.0* 5.0*   ALBUMIN 2.3* 2.0* 2.0*   BILITOT 2.8* 2.9* 3.2*   ALKPHOS 204* 189* 189*   * 147* 134*   ALT 52* 45* 42   ANIONGAP 18* 9 5*       Significant Imaging: I have reviewed all pertinent imaging results/findings within the past 24 hours.    Assessment/Plan:      No notes have been filed under this hospital service.  Service: Hospital Medicine    VTE Risk Mitigation (From admission, onward)           Ordered     IP VTE LOW RISK PATIENT  Once         11/23/23 2303     Place sequential compression device  Until discontinued         11/23/23 2303                    Discharge Planning   SHANIQUE: 11/28/2023     Code Status: Full Code   Is the patient medically ready for discharge?:     Reason for patient still in hospital (select all that apply): Patient  unstable, Patient trending condition, and Treatment  Discharge Plan A: Home with family                  Mukesh Lorenzo MD  Department of Hospital Medicine   [facility]

## 2023-11-26 LAB
ALBUMIN SERPL BCP-MCNC: 2 G/DL (ref 3.5–5.2)
ALP SERPL-CCNC: 195 U/L (ref 55–135)
ALT SERPL W/O P-5'-P-CCNC: 39 U/L (ref 10–44)
ANION GAP SERPL CALC-SCNC: 6 MMOL/L (ref 8–16)
ANION GAP SERPL CALC-SCNC: 9 MMOL/L (ref 8–16)
AST SERPL-CCNC: 111 U/L (ref 10–40)
BASOPHILS # BLD AUTO: 0.03 K/UL (ref 0–0.2)
BASOPHILS NFR BLD: 0.5 % (ref 0–1.9)
BILIRUB SERPL-MCNC: 3.3 MG/DL (ref 0.1–1)
BUN SERPL-MCNC: 2 MG/DL (ref 6–20)
BUN SERPL-MCNC: <2 MG/DL (ref 6–20)
CALCIUM SERPL-MCNC: 6.9 MG/DL (ref 8.7–10.5)
CALCIUM SERPL-MCNC: 7.3 MG/DL (ref 8.7–10.5)
CHLORIDE SERPL-SCNC: 109 MMOL/L (ref 95–110)
CHLORIDE SERPL-SCNC: 110 MMOL/L (ref 95–110)
CO2 SERPL-SCNC: 16 MMOL/L (ref 23–29)
CO2 SERPL-SCNC: 20 MMOL/L (ref 23–29)
CREAT SERPL-MCNC: 0.3 MG/DL (ref 0.5–1.4)
CREAT SERPL-MCNC: 0.4 MG/DL (ref 0.5–1.4)
DIFFERENTIAL METHOD: ABNORMAL
EOSINOPHIL # BLD AUTO: 0.1 K/UL (ref 0–0.5)
EOSINOPHIL NFR BLD: 2.5 % (ref 0–8)
ERYTHROCYTE [DISTWIDTH] IN BLOOD BY AUTOMATED COUNT: 14.6 % (ref 11.5–14.5)
EST. GFR  (NO RACE VARIABLE): >60 ML/MIN/1.73 M^2
EST. GFR  (NO RACE VARIABLE): >60 ML/MIN/1.73 M^2
GLUCOSE SERPL-MCNC: 190 MG/DL (ref 70–110)
GLUCOSE SERPL-MCNC: 84 MG/DL (ref 70–110)
HCT VFR BLD AUTO: 27.1 % (ref 40–54)
HGB BLD-MCNC: 8.8 G/DL (ref 14–18)
IMM GRANULOCYTES # BLD AUTO: 0.03 K/UL (ref 0–0.04)
IMM GRANULOCYTES NFR BLD AUTO: 0.5 % (ref 0–0.5)
LYMPHOCYTES # BLD AUTO: 1.4 K/UL (ref 1–4.8)
LYMPHOCYTES NFR BLD: 24.7 % (ref 18–48)
MCH RBC QN AUTO: 38.4 PG (ref 27–31)
MCHC RBC AUTO-ENTMCNC: 32.5 G/DL (ref 32–36)
MCV RBC AUTO: 118 FL (ref 82–98)
MONOCYTES # BLD AUTO: 0.7 K/UL (ref 0.3–1)
MONOCYTES NFR BLD: 12.3 % (ref 4–15)
NEUTROPHILS # BLD AUTO: 3.4 K/UL (ref 1.8–7.7)
NEUTROPHILS NFR BLD: 59.5 % (ref 38–73)
NRBC BLD-RTO: 0 /100 WBC
PLATELET # BLD AUTO: 80 K/UL (ref 150–450)
PMV BLD AUTO: 13.4 FL (ref 9.2–12.9)
POTASSIUM SERPL-SCNC: 3 MMOL/L (ref 3.5–5.1)
POTASSIUM SERPL-SCNC: 4.4 MMOL/L (ref 3.5–5.1)
PROT SERPL-MCNC: 5 G/DL (ref 6–8.4)
RBC # BLD AUTO: 2.29 M/UL (ref 4.6–6.2)
SODIUM SERPL-SCNC: 135 MMOL/L (ref 136–145)
SODIUM SERPL-SCNC: 135 MMOL/L (ref 136–145)
WBC # BLD AUTO: 5.63 K/UL (ref 3.9–12.7)

## 2023-11-26 PROCEDURE — 25000003 PHARM REV CODE 250: Performed by: INTERNAL MEDICINE

## 2023-11-26 PROCEDURE — 36415 COLL VENOUS BLD VENIPUNCTURE: CPT | Performed by: INTERNAL MEDICINE

## 2023-11-26 PROCEDURE — 63600175 PHARM REV CODE 636 W HCPCS: Performed by: INTERNAL MEDICINE

## 2023-11-26 PROCEDURE — S4991 NICOTINE PATCH NONLEGEND: HCPCS | Performed by: STUDENT IN AN ORGANIZED HEALTH CARE EDUCATION/TRAINING PROGRAM

## 2023-11-26 PROCEDURE — 25000003 PHARM REV CODE 250: Performed by: STUDENT IN AN ORGANIZED HEALTH CARE EDUCATION/TRAINING PROGRAM

## 2023-11-26 PROCEDURE — 80053 COMPREHEN METABOLIC PANEL: CPT | Performed by: INTERNAL MEDICINE

## 2023-11-26 PROCEDURE — 21400001 HC TELEMETRY ROOM

## 2023-11-26 PROCEDURE — 85025 COMPLETE CBC W/AUTO DIFF WBC: CPT | Performed by: INTERNAL MEDICINE

## 2023-11-26 PROCEDURE — 80048 BASIC METABOLIC PNL TOTAL CA: CPT | Performed by: INTERNAL MEDICINE

## 2023-11-26 RX ORDER — TALC
6 POWDER (GRAM) TOPICAL NIGHTLY PRN
Status: DISCONTINUED | OUTPATIENT
Start: 2023-11-26 | End: 2023-11-27 | Stop reason: HOSPADM

## 2023-11-26 RX ORDER — CITALOPRAM 20 MG/1
20 TABLET, FILM COATED ORAL DAILY
Status: DISCONTINUED | OUTPATIENT
Start: 2023-11-27 | End: 2023-11-27 | Stop reason: HOSPADM

## 2023-11-26 RX ORDER — CHLORDIAZEPOXIDE HYDROCHLORIDE 5 MG/1
5 CAPSULE, GELATIN COATED ORAL 3 TIMES DAILY
Status: DISCONTINUED | OUTPATIENT
Start: 2023-11-26 | End: 2023-11-27 | Stop reason: HOSPADM

## 2023-11-26 RX ADMIN — NICOTINE 1 PATCH: 14 PATCH, EXTENDED RELEASE TRANSDERMAL at 09:11

## 2023-11-26 RX ADMIN — POTASSIUM BICARBONATE 60 MEQ: 782 TABLET, EFFERVESCENT ORAL at 09:11

## 2023-11-26 RX ADMIN — LORAZEPAM 2 MG: 2 INJECTION, SOLUTION INTRAMUSCULAR; INTRAVENOUS at 02:11

## 2023-11-26 RX ADMIN — SUCRALFATE 1 G: 1 SUSPENSION ORAL at 12:11

## 2023-11-26 RX ADMIN — POTASSIUM BICARBONATE 60 MEQ: 782 TABLET, EFFERVESCENT ORAL at 02:11

## 2023-11-26 RX ADMIN — Medication 6 MG: at 01:11

## 2023-11-26 RX ADMIN — CITALOPRAM HYDROBROMIDE 10 MG: 10 TABLET ORAL at 09:11

## 2023-11-26 RX ADMIN — CHLORDIAZEPOXIDE HYDROCHLORIDE 10 MG: 5 CAPSULE ORAL at 09:11

## 2023-11-26 RX ADMIN — CHLORDIAZEPOXIDE HYDROCHLORIDE 5 MG: 5 CAPSULE ORAL at 02:11

## 2023-11-26 RX ADMIN — PANTOPRAZOLE SODIUM 40 MG: 40 TABLET, DELAYED RELEASE ORAL at 08:11

## 2023-11-26 RX ADMIN — CHLORDIAZEPOXIDE HYDROCHLORIDE 5 MG: 5 CAPSULE ORAL at 08:11

## 2023-11-26 RX ADMIN — PANTOPRAZOLE SODIUM 40 MG: 40 TABLET, DELAYED RELEASE ORAL at 09:11

## 2023-11-26 RX ADMIN — LORAZEPAM 2 MG: 2 INJECTION, SOLUTION INTRAMUSCULAR; INTRAVENOUS at 08:11

## 2023-11-26 RX ADMIN — SUCRALFATE 1 G: 1 SUSPENSION ORAL at 05:11

## 2023-11-26 RX ADMIN — THIAMINE HCL TAB 100 MG 100 MG: 100 TAB at 09:11

## 2023-11-26 RX ADMIN — Medication 6 MG: at 08:11

## 2023-11-26 NOTE — PLAN OF CARE
Problem: Adult Inpatient Plan of Care  Goal: Plan of Care Review  Outcome: Ongoing, Progressing  Goal: Absence of Hospital-Acquired Illness or Injury  Outcome: Ongoing, Progressing  Goal: Readiness for Transition of Care  Outcome: Ongoing, Progressing     Problem: Electrolyte Imbalance  Goal: Electrolyte Balance  Outcome: Ongoing, Progressing

## 2023-11-26 NOTE — SUBJECTIVE & OBJECTIVE
Interval History: ETOH abuse; depression; not suicidal; hypokalemia    Review of Systems   Constitutional:  Positive for fatigue.   HENT: Negative.     Eyes: Negative.    Respiratory: Negative.     Cardiovascular: Negative.    Gastrointestinal: Negative.    Endocrine: Negative.    Genitourinary: Negative.    Musculoskeletal: Negative.    Skin: Negative.    Allergic/Immunologic: Negative.    Neurological: Negative.    Hematological: Negative.    All other systems reviewed and are negative.    Objective:     Vital Signs (Most Recent):  Temp: 97.9 °F (36.6 °C) (11/26/23 1132)  Pulse: 99 (11/26/23 1132)  Resp: 16 (11/26/23 1132)  BP: 96/61 (11/26/23 1132)  SpO2: (!) 94 % (11/26/23 1132) Vital Signs (24h Range):  Temp:  [97.7 °F (36.5 °C)-98.6 °F (37 °C)] 97.9 °F (36.6 °C)  Pulse:  [] 99  Resp:  [16-18] 16  SpO2:  [94 %-100 %] 94 %  BP: ()/(61-91) 96/61     Weight: 64.8 kg (142 lb 13.7 oz)  Body mass index is 23.06 kg/m².    Intake/Output Summary (Last 24 hours) at 11/26/2023 1354  Last data filed at 11/26/2023 1135  Gross per 24 hour   Intake 720 ml   Output 1375 ml   Net -655 ml         Physical Exam  Vitals and nursing note reviewed.   Constitutional:       Appearance: He is well-developed.   HENT:      Head: Normocephalic and atraumatic.      Right Ear: External ear normal.      Left Ear: External ear normal.      Nose: Nose normal.   Eyes:      Conjunctiva/sclera: Conjunctivae normal.      Pupils: Pupils are equal, round, and reactive to light.   Cardiovascular:      Rate and Rhythm: Normal rate and regular rhythm.      Heart sounds: Normal heart sounds.   Pulmonary:      Effort: Pulmonary effort is normal.      Breath sounds: Normal breath sounds.   Abdominal:      General: Bowel sounds are normal.      Palpations: Abdomen is soft.   Musculoskeletal:         General: Normal range of motion.      Cervical back: Normal range of motion and neck supple.   Skin:     General: Skin is warm and dry.       Capillary Refill: Capillary refill takes less than 2 seconds.   Neurological:      Mental Status: He is alert and oriented to person, place, and time.   Psychiatric:         Behavior: Behavior normal.         Thought Content: Thought content normal.         Judgment: Judgment normal.             Significant Labs: All pertinent labs within the past 24 hours have been reviewed.  BMP:   Recent Labs   Lab 11/26/23  0405   GLU 84   *   K 3.0*      CO2 16*   BUN 2*   CREATININE 0.3*   CALCIUM 6.9*     CBC:   Recent Labs   Lab 11/25/23  0452 11/26/23  0405   WBC 4.87 5.63   HGB 8.6* 8.8*   HCT 25.4* 27.1*   PLT 82* 80*       Significant Imaging: I have reviewed all pertinent imaging results/findings within the past 24 hours.

## 2023-11-26 NOTE — PROGRESS NOTES
"[facility] Medicine  Progress Note    Patient Name: Herson Araiza  MRN: [id number]  Patient Class: IP- Inpatient   Admission Date: 11/23/2023  Length of Stay: 3 days  Attending Physician: Mukesh Lorenzo MD  Primary Care Provider: Norma, Primary Doctor        Subjective:     Principal Problem:Hypokalemia        HPI:  HPI: 42 y/o M drinks 5th of vodka daily, 1PPD smoking, presents to the ER for vomiting starting yesterday.  He has been  unable to tolerate solids.  His last drink was yesterday. Sister told him to come in.      In ER was with tachycardia, hypokalemia, elevated transaminase, hyponatremic     Denies past medical history  NKDA    Overview/Hospital Course:  11/24  Assumed care. Discussed with GI: was fed by SLP this AM--EGD not possible today; to be npo p midnight with EGD in AM. Labs reviewed and noted below: no leukocytosis with moderate macrocytic anemia; trivial hyponatremia with hypokalemia (sliding scale); mild hyperbilirubinemia with mild elevation of transaminases; lactate normal. Telemetry reviewed: sinus. Day 2 of no ETOH. Feels "OK". No tremor (yet). Wife at bedside: patient normally take citalopram 10 mg q day, which he is not currently on. Plan: NPO p midnight; continue current course; adding above citalopram; "Banana Bag" X 1 liter then back to NS; electrolyte sliding scale; TSH with AM labs for review.    11/25  Had some "Confusion" overnight according to wife at bedside and RN. Patient is A/O X 4 currently. Not aggressive/combative. No further N/V. Hands have started to develop very minimal tremor when compared to yesterday. Labs reviewed and noted below: no leukocytosis with fairly stable moderte macrocytic anemia; trivial hyponatremia, mild hypokalemia (on sliding scale) with normal renal function; hyperbilirubinemia continues to slowly rise, but elevated transaminases are falling; TSH is normal. BP has been soft overnight (high 90's-low 100's systolic). No CP/SOB. " "For EGD this AM.  Plan:  ml bolus, then continue NS infusion; continue prn lorazepam; increase scheduled chlordiazepoxide; if BP improves will start clonidine for further withdrawal therapy; keep on telemetry currently, but will transfer out should Precedex become necessary--keep close eye on patient; AM labs for review    11/26  Consultant's attendance and procedure noted and appreciated. Patient has not developed signs/symptoms of acute ETOH withdrawal. Mother at bedside and patient request outpatient referral to Psychiatry. Patient denies suicidal/homicidal ideation. No thought disorder. No tangential thinking. Is A/O X 4. Does not live here, but [facility]. Will put consultation in for patient so he may have follow-up at discharge. No further N/V. Transaminases are improving. Hypokalemia was covered this AM. Feels "OK, but tired." Plan: decrease chlordiazepoxide to 5 mg q6h; repeat BMP this evening; continue regimen otherwise; hopefully discharge in AM           Interval History: ETOH abuse; depression; not suicidal; hypokalemia    Review of Systems   Constitutional:  Positive for fatigue.   HENT: Negative.     Eyes: Negative.    Respiratory: Negative.     Cardiovascular: Negative.    Gastrointestinal: Negative.    Endocrine: Negative.    Genitourinary: Negative.    Musculoskeletal: Negative.    Skin: Negative.    Allergic/Immunologic: Negative.    Neurological: Negative.    Hematological: Negative.    All other systems reviewed and are negative.    Objective:     Vital Signs (Most Recent):  Temp: 97.9 °F (36.6 °C) (11/26/23 1132)  Pulse: 99 (11/26/23 1132)  Resp: 16 (11/26/23 1132)  BP: 96/61 (11/26/23 1132)  SpO2: (!) 94 % (11/26/23 1132) Vital Signs (24h Range):  Temp:  [97.7 °F (36.5 °C)-98.6 °F (37 °C)] 97.9 °F (36.6 °C)  Pulse:  [] 99  Resp:  [16-18] 16  SpO2:  [94 %-100 %] 94 %  BP: ()/(61-91) 96/61     Weight: 64.8 kg (142 lb 13.7 oz)  Body mass index is 23.06 " kg/m².    Intake/Output Summary (Last 24 hours) at 11/26/2023 1354  Last data filed at 11/26/2023 1135  Gross per 24 hour   Intake 720 ml   Output 1375 ml   Net -655 ml         Physical Exam  Vitals and nursing note reviewed.   Constitutional:       Appearance: He is well-developed.   HENT:      Head: Normocephalic and atraumatic.      Right Ear: External ear normal.      Left Ear: External ear normal.      Nose: Nose normal.   Eyes:      Conjunctiva/sclera: Conjunctivae normal.      Pupils: Pupils are equal, round, and reactive to light.   Cardiovascular:      Rate and Rhythm: Normal rate and regular rhythm.      Heart sounds: Normal heart sounds.   Pulmonary:      Effort: Pulmonary effort is normal.      Breath sounds: Normal breath sounds.   Abdominal:      General: Bowel sounds are normal.      Palpations: Abdomen is soft.   Musculoskeletal:         General: Normal range of motion.      Cervical back: Normal range of motion and neck supple.   Skin:     General: Skin is warm and dry.      Capillary Refill: Capillary refill takes less than 2 seconds.   Neurological:      Mental Status: He is alert and oriented to person, place, and time.   Psychiatric:         Behavior: Behavior normal.         Thought Content: Thought content normal.         Judgment: Judgment normal.             Significant Labs: All pertinent labs within the past 24 hours have been reviewed.  BMP:   Recent Labs   Lab 11/26/23  0405   GLU 84   *   K 3.0*      CO2 16*   BUN 2*   CREATININE 0.3*   CALCIUM 6.9*     CBC:   Recent Labs   Lab 11/25/23  0452 11/26/23  0405   WBC 4.87 5.63   HGB 8.6* 8.8*   HCT 25.4* 27.1*   PLT 82* 80*       Significant Imaging: I have reviewed all pertinent imaging results/findings within the past 24 hours.    Assessment/Plan:      No notes have been filed under this hospital service.  Service: Hospital Medicine    VTE Risk Mitigation (From admission, onward)           Ordered     IP VTE LOW RISK PATIENT   Once         11/23/23 2303     Place sequential compression device  Until discontinued         11/23/23 2303                    Discharge Planning   SHANIQUE: 11/28/2023     Code Status: Full Code   Is the patient medically ready for discharge?:     Reason for patient still in hospital (select all that apply): Patient trending condition and Laboratory test  Discharge Plan A: Home with family                  Mukesh Lorenzo MD  Department of Hospital Medicine   [facility]

## 2023-11-27 VITALS
DIASTOLIC BLOOD PRESSURE: 62 MMHG | OXYGEN SATURATION: 96 % | HEIGHT: 66 IN | BODY MASS INDEX: 22.85 KG/M2 | WEIGHT: 142.19 LBS | RESPIRATION RATE: 16 BRPM | SYSTOLIC BLOOD PRESSURE: 98 MMHG | TEMPERATURE: 99 F | HEART RATE: 118 BPM

## 2023-11-27 PROBLEM — E87.6 HYPOKALEMIA: Status: RESOLVED | Noted: 2023-11-23 | Resolved: 2023-11-27

## 2023-11-27 LAB
ALBUMIN SERPL BCP-MCNC: 1.8 G/DL (ref 3.5–5.2)
ALP SERPL-CCNC: 168 U/L (ref 55–135)
ALT SERPL W/O P-5'-P-CCNC: 33 U/L (ref 10–44)
ANION GAP SERPL CALC-SCNC: 4 MMOL/L (ref 8–16)
AST SERPL-CCNC: 74 U/L (ref 10–40)
BASOPHILS # BLD AUTO: 0.04 K/UL (ref 0–0.2)
BASOPHILS NFR BLD: 0.6 % (ref 0–1.9)
BILIRUB SERPL-MCNC: 2.8 MG/DL (ref 0.1–1)
BUN SERPL-MCNC: <2 MG/DL (ref 6–20)
CALCIUM SERPL-MCNC: 7.2 MG/DL (ref 8.7–10.5)
CHLORIDE SERPL-SCNC: 109 MMOL/L (ref 95–110)
CO2 SERPL-SCNC: 21 MMOL/L (ref 23–29)
CREAT SERPL-MCNC: 0.4 MG/DL (ref 0.5–1.4)
DIFFERENTIAL METHOD: ABNORMAL
EOSINOPHIL # BLD AUTO: 0.1 K/UL (ref 0–0.5)
EOSINOPHIL NFR BLD: 1.9 % (ref 0–8)
ERYTHROCYTE [DISTWIDTH] IN BLOOD BY AUTOMATED COUNT: 14.9 % (ref 11.5–14.5)
EST. GFR  (NO RACE VARIABLE): >60 ML/MIN/1.73 M^2
GLUCOSE SERPL-MCNC: 97 MG/DL (ref 70–110)
HCT VFR BLD AUTO: 24.5 % (ref 40–54)
HGB BLD-MCNC: 8.2 G/DL (ref 14–18)
IMM GRANULOCYTES # BLD AUTO: 0.01 K/UL (ref 0–0.04)
IMM GRANULOCYTES NFR BLD AUTO: 0.2 % (ref 0–0.5)
LYMPHOCYTES # BLD AUTO: 1.7 K/UL (ref 1–4.8)
LYMPHOCYTES NFR BLD: 27.1 % (ref 18–48)
MCH RBC QN AUTO: 38.9 PG (ref 27–31)
MCHC RBC AUTO-ENTMCNC: 33.5 G/DL (ref 32–36)
MCV RBC AUTO: 116 FL (ref 82–98)
MONOCYTES # BLD AUTO: 1 K/UL (ref 0.3–1)
MONOCYTES NFR BLD: 16.8 % (ref 4–15)
NEUTROPHILS # BLD AUTO: 3.3 K/UL (ref 1.8–7.7)
NEUTROPHILS NFR BLD: 53.4 % (ref 38–73)
NRBC BLD-RTO: 0 /100 WBC
PLATELET # BLD AUTO: 88 K/UL (ref 150–450)
PMV BLD AUTO: 12.8 FL (ref 9.2–12.9)
POTASSIUM SERPL-SCNC: 4 MMOL/L (ref 3.5–5.1)
PROT SERPL-MCNC: 4.7 G/DL (ref 6–8.4)
RBC # BLD AUTO: 2.11 M/UL (ref 4.6–6.2)
SODIUM SERPL-SCNC: 134 MMOL/L (ref 136–145)
WBC # BLD AUTO: 6.2 K/UL (ref 3.9–12.7)

## 2023-11-27 PROCEDURE — 36415 COLL VENOUS BLD VENIPUNCTURE: CPT | Performed by: INTERNAL MEDICINE

## 2023-11-27 PROCEDURE — 25000003 PHARM REV CODE 250: Performed by: STUDENT IN AN ORGANIZED HEALTH CARE EDUCATION/TRAINING PROGRAM

## 2023-11-27 PROCEDURE — 25000003 PHARM REV CODE 250: Performed by: INTERNAL MEDICINE

## 2023-11-27 PROCEDURE — 85025 COMPLETE CBC W/AUTO DIFF WBC: CPT | Performed by: INTERNAL MEDICINE

## 2023-11-27 PROCEDURE — S4991 NICOTINE PATCH NONLEGEND: HCPCS | Performed by: STUDENT IN AN ORGANIZED HEALTH CARE EDUCATION/TRAINING PROGRAM

## 2023-11-27 PROCEDURE — 80053 COMPREHEN METABOLIC PANEL: CPT | Performed by: INTERNAL MEDICINE

## 2023-11-27 PROCEDURE — 63600175 PHARM REV CODE 636 W HCPCS: Performed by: INTERNAL MEDICINE

## 2023-11-27 RX ORDER — CITALOPRAM 20 MG/1
20 TABLET, FILM COATED ORAL DAILY
Qty: 30 TABLET | Refills: 11 | Status: SHIPPED | OUTPATIENT
Start: 2023-11-28 | End: 2024-11-27

## 2023-11-27 RX ORDER — CHLORDIAZEPOXIDE HYDROCHLORIDE 5 MG/1
CAPSULE, GELATIN COATED ORAL
Qty: 10 CAPSULE | Refills: 0 | Status: SHIPPED | OUTPATIENT
Start: 2023-11-27 | End: 2023-12-01

## 2023-11-27 RX ADMIN — PANTOPRAZOLE SODIUM 40 MG: 40 TABLET, DELAYED RELEASE ORAL at 09:11

## 2023-11-27 RX ADMIN — SUCRALFATE 1 G: 1 SUSPENSION ORAL at 06:11

## 2023-11-27 RX ADMIN — SUCRALFATE 1 G: 1 SUSPENSION ORAL at 12:11

## 2023-11-27 RX ADMIN — NICOTINE 1 PATCH: 14 PATCH, EXTENDED RELEASE TRANSDERMAL at 09:11

## 2023-11-27 RX ADMIN — CHLORDIAZEPOXIDE HYDROCHLORIDE 5 MG: 5 CAPSULE ORAL at 09:11

## 2023-11-27 RX ADMIN — THIAMINE HCL TAB 100 MG 100 MG: 100 TAB at 09:11

## 2023-11-27 RX ADMIN — LORAZEPAM 1 MG: 2 INJECTION, SOLUTION INTRAMUSCULAR; INTRAVENOUS at 06:11

## 2023-11-27 RX ADMIN — CITALOPRAM HYDROBROMIDE 20 MG: 20 TABLET ORAL at 09:11

## 2023-11-27 NOTE — PLAN OF CARE
11/27/23 0949   Final Note   Assessment Type Final Discharge Note   Anticipated Discharge Disposition Home   What phone number can be called within the next 1-3 days to see how you are doing after discharge? [phone]   Hospital Resources/Appts/Education Provided Community resources provided;Provided patient/caregiver with written discharge plan information   Post-Acute Status   Discharge Delays None known at this time     Patient cleared for discharge from case management standpoint.    Referral provided to establish follow up care at home.    Chart and discharge orders reviewed.  Patient discharged home with no further case management needs.

## 2023-11-27 NOTE — PLAN OF CARE
Problem: Adult Inpatient Plan of Care  Goal: Plan of Care Review  Outcome: Met  Goal: Patient-Specific Goal (Individualized)  Outcome: Met  Goal: Absence of Hospital-Acquired Illness or Injury  Outcome: Met  Goal: Optimal Comfort and Wellbeing  Outcome: Met  Goal: Readiness for Transition of Care  Outcome: Met     Problem: Electrolyte Imbalance  Goal: Electrolyte Balance  Outcome: Met

## 2023-11-27 NOTE — NURSING
Discharge instructions reviewed with pt. Questions answered. Copy of discharge instructions given to pt. Discharge home via wheelchair to vehicle. Accompanied by Mother.

## 2023-11-27 NOTE — PLAN OF CARE
Problem: Adult Inpatient Plan of Care  Goal: Plan of Care Review  Outcome: Ongoing, Progressing  Goal: Optimal Comfort and Wellbeing  Outcome: Ongoing, Progressing  Goal: Readiness for Transition of Care  Outcome: Ongoing, Progressing     Problem: Electrolyte Imbalance  Goal: Electrolyte Balance  Outcome: Ongoing, Progressing

## 2023-11-27 NOTE — DISCHARGE SUMMARY
"[facility] Medicine  Discharge Summary      Patient Name: Herson Araiza  MRN: [id number]  DIANA: [id number]  Patient Class: IP- Inpatient  Admission Date: 11/23/2023  Hospital Length of Stay: 4 days  Discharge Date and Time:  11/27/2023 9:47 AM  Attending Physician: Mukesh Lorenzo MD   Discharging Provider: Mukesh Lorenzo MD  Primary Care Provider: Norma, Primary Doctor    Primary Care Team: Networked reference to record PCT     HPI:   HPI: 44 y/o M drinks 5th of vodka daily, 1PPD smoking, presents to the ER for vomiting starting yesterday.  He has been  unable to tolerate solids.  His last drink was yesterday. Sister told him to come in.      In ER was with tachycardia, hypokalemia, elevated transaminase, hyponatremic     Denies past medical history  NKDA    Procedure(s) (LRB):  EGD (ESOPHAGOGASTRODUODENOSCOPY) (N/A)      Hospital Course:   11/24  Assumed care. Discussed with GI: was fed by SLP this AM--EGD not possible today; to be npo p midnight with EGD in AM. Labs reviewed and noted below: no leukocytosis with moderate macrocytic anemia; trivial hyponatremia with hypokalemia (sliding scale); mild hyperbilirubinemia with mild elevation of transaminases; lactate normal. Telemetry reviewed: sinus. Day 2 of no ETOH. Feels "OK". No tremor (yet). Wife at bedside: patient normally take citalopram 10 mg q day, which he is not currently on. Plan: NPO p midnight; continue current course; adding above citalopram; "Banana Bag" X 1 liter then back to NS; electrolyte sliding scale; TSH with AM labs for review.    11/25  Had some "Confusion" overnight according to wife at bedside and RN. Patient is A/O X 4 currently. Not aggressive/combative. No further N/V. Hands have started to develop very minimal tremor when compared to yesterday. Labs reviewed and noted below: no leukocytosis with fairly stable moderte macrocytic anemia; trivial hyponatremia, mild hypokalemia (on sliding scale) with normal renal " "function; hyperbilirubinemia continues to slowly rise, but elevated transaminases are falling; TSH is normal. BP has been soft overnight (high 90's-low 100's systolic). No CP/SOB. For EGD this AM.  Plan:  ml bolus, then continue NS infusion; continue prn lorazepam; increase scheduled chlordiazepoxide; if BP improves will start clonidine for further withdrawal therapy; keep on telemetry currently, but will transfer out should Precedex become necessary--keep close eye on patient; AM labs for review    11/26  Consultant's attendance and procedure noted and appreciated. Patient has not developed signs/symptoms of acute ETOH withdrawal. Mother at bedside and patient request outpatient referral to Psychiatry. Patient denies suicidal/homicidal ideation. No thought disorder. No tangential thinking. Is A/O X 4. Does not live here, but rather in [facility]. Will put consultation in for patient so he may have follow-up at discharge. No further N/V. Transaminases are improving. Hypokalemia was covered this AM. Feels "OK, but tired." Plan: decrease chlordiazepoxide to 5 mg q6h; repeat BMP this evening; continue regimen otherwise; hopefully discharge in AM     11/27  Doing well. Mother at bedside. Would like to be discharged home with outpatient follow-up in [facility] where he resides. Potassium has normalized. A/O X 4 without stigmata of withdrawal. Is ready for discharge with close outpatient follow-up. No suicidal/homicidal ideation. No thought disorder. No paranoid thinking.  VSS  Lungs no adventitious  Heart S1S2  Abdo soft  Imp Alcoholism  Plan discharge on tapering dose of chlordiazepoxide and citalopram as per discharge med rec ;ist below; act as tolerated; outpatient referral to Psychiatry placed; dicussed need to abstain; not ready yet to stop smoking; regular diet         Goals of Care Treatment Preferences:  Code Status: Full Code      Consults:   Consults (From admission, onward)          Status " Ordering Provider     Inpatient consult to Gastroenterology  Once        Provider:  RICHARD Salomon MD    Completed AMMON AMATO            No new Assessment & Plan notes have been filed under this hospital service since the last note was generated.  Service: Hospital Medicine    Final Active Diagnoses:      Problems Resolved During this Admission:    Diagnosis Date Noted Date Resolved POA    PRINCIPAL PROBLEM:  Hypokalemia [E87.6] 11/23/2023 11/27/2023 Yes       Discharged Condition: good    Disposition: Home or Self Care    Follow Up:   Follow-up Information       [facility] Follow up in 1 week(s).    Why: Post discharge follow-up  Contact information:  [address]  [phone]  [facility] [address]  [phone]                           Patient Instructions:      Ambulatory referral/consult to Psychiatry   Standing Status: Future   Referral Priority: Routine Referral Type: Psychiatric   Referral Reason: Specialty Services Required   Requested Specialty: Psychiatry   Number of Visits Requested: 1     Diet Adult Regular     Reason for not Ordering Smoking Cessation Referral     Order Specific Question Answer Comments   Reason for not ordering: Not medically appropriate at this time      Reason for not Prescribing Nicotine Replacement     Order Specific Question Answer Comments   Reason for not Prescribing: Not medically appropriate at this time      Activity as tolerated       Significant Diagnostic Studies: Labs: BMP:   Recent Labs   Lab 11/26/23  0405 11/26/23 1938 11/27/23 0457   GLU 84 190* 97   * 135* 134*   K 3.0* 4.4 4.0    109 109   CO2 16* 20* 21*   BUN 2* <2* <2*   CREATININE 0.3* 0.4* 0.4*   CALCIUM 6.9* 7.3* 7.2*    and CBC   Recent Labs   Lab 11/26/23  0405 11/27/23 0457   WBC 5.63 6.20   HGB 8.8* 8.2*   HCT 27.1* 24.5*   PLT 80* 88*       Pending Diagnostic Studies:       Procedure Component Value Units Date/Time    Specimen to Pathology - Surgery  [[id number]] Collected: 11/25/23 0908    Order Status: Sent Lab Status: No result     Specimen: Tissue            Medications:  Reconciled Home Medications:      Medication List        START taking these medications      chlordiazepoxide 5 MG capsule  Commonly known as: LIBRIUM  Take 1 capsule (5 mg total) by mouth 4 (four) times daily for 1 day, THEN 1 capsule (5 mg total) 3 (three) times daily for 1 day, THEN 1 capsule (5 mg total) 2 (two) times a day for 1 day, THEN 1 capsule (5 mg total) once daily for 1 day.  Start taking on: November 27, 2023     citalopram 20 MG tablet  Commonly known as: CeleXA  Take 1 tablet (20 mg total) by mouth once daily.  Start taking on: November 28, 2023              Indwelling Lines/Drains at time of discharge:   Lines/Drains/Airways       None                   Time spent on the discharge of patient: 32  minutes         Mukesh Lorenzo MD  Department of Hospital Medicine  [facility]